# Patient Record
Sex: FEMALE | Race: OTHER | HISPANIC OR LATINO | URBAN - METROPOLITAN AREA
[De-identification: names, ages, dates, MRNs, and addresses within clinical notes are randomized per-mention and may not be internally consistent; named-entity substitution may affect disease eponyms.]

---

## 2022-07-03 ENCOUNTER — OUTPATIENT (OUTPATIENT)
Dept: OUTPATIENT SERVICES | Facility: HOSPITAL | Age: 22
LOS: 1 days | End: 2022-07-03
Payer: MEDICAID

## 2022-07-03 DIAGNOSIS — O26.899 OTHER SPECIFIED PREGNANCY RELATED CONDITIONS, UNSPECIFIED TRIMESTER: ICD-10-CM

## 2022-07-03 DIAGNOSIS — Z3A.00 WEEKS OF GESTATION OF PREGNANCY NOT SPECIFIED: ICD-10-CM

## 2022-07-03 LAB
ALBUMIN SERPL ELPH-MCNC: 2.8 G/DL — LOW (ref 3.5–5)
ALP SERPL-CCNC: 86 U/L — SIGNIFICANT CHANGE UP (ref 40–120)
ALT FLD-CCNC: 11 U/L DA — SIGNIFICANT CHANGE UP (ref 10–60)
AMYLASE P1 CFR SERPL: 71 U/L — SIGNIFICANT CHANGE UP (ref 25–115)
ANION GAP SERPL CALC-SCNC: 8 MMOL/L — SIGNIFICANT CHANGE UP (ref 5–17)
APPEARANCE UR: CLEAR — SIGNIFICANT CHANGE UP
AST SERPL-CCNC: 11 U/L — SIGNIFICANT CHANGE UP (ref 10–40)
BACTERIA # UR AUTO: ABNORMAL /HPF
BASOPHILS # BLD AUTO: 0.03 K/UL — SIGNIFICANT CHANGE UP (ref 0–0.2)
BASOPHILS NFR BLD AUTO: 0.3 % — SIGNIFICANT CHANGE UP (ref 0–2)
BILIRUB SERPL-MCNC: 0.7 MG/DL — SIGNIFICANT CHANGE UP (ref 0.2–1.2)
BILIRUB UR-MCNC: NEGATIVE — SIGNIFICANT CHANGE UP
BUN SERPL-MCNC: 3 MG/DL — LOW (ref 7–18)
CALCIUM SERPL-MCNC: 8.9 MG/DL — SIGNIFICANT CHANGE UP (ref 8.4–10.5)
CHLORIDE SERPL-SCNC: 107 MMOL/L — SIGNIFICANT CHANGE UP (ref 96–108)
CO2 SERPL-SCNC: 25 MMOL/L — SIGNIFICANT CHANGE UP (ref 22–31)
COLOR SPEC: YELLOW — SIGNIFICANT CHANGE UP
CREAT SERPL-MCNC: 0.45 MG/DL — LOW (ref 0.5–1.3)
DIFF PNL FLD: NEGATIVE — SIGNIFICANT CHANGE UP
EGFR: 140 ML/MIN/1.73M2 — SIGNIFICANT CHANGE UP
EOSINOPHIL # BLD AUTO: 0.02 K/UL — SIGNIFICANT CHANGE UP (ref 0–0.5)
EOSINOPHIL NFR BLD AUTO: 0.2 % — SIGNIFICANT CHANGE UP (ref 0–6)
GLUCOSE SERPL-MCNC: 82 MG/DL — SIGNIFICANT CHANGE UP (ref 70–99)
GLUCOSE UR QL: NEGATIVE — SIGNIFICANT CHANGE UP
HCT VFR BLD CALC: 34 % — LOW (ref 34.5–45)
HGB BLD-MCNC: 11.2 G/DL — LOW (ref 11.5–15.5)
IMM GRANULOCYTES NFR BLD AUTO: 0.8 % — SIGNIFICANT CHANGE UP (ref 0–1.5)
KETONES UR-MCNC: NEGATIVE — SIGNIFICANT CHANGE UP
LEUKOCYTE ESTERASE UR-ACNC: ABNORMAL
LIDOCAIN IGE QN: 105 U/L — SIGNIFICANT CHANGE UP (ref 73–393)
LYMPHOCYTES # BLD AUTO: 18.7 % — SIGNIFICANT CHANGE UP (ref 13–44)
LYMPHOCYTES # BLD AUTO: 2.2 K/UL — SIGNIFICANT CHANGE UP (ref 1–3.3)
MCHC RBC-ENTMCNC: 28.6 PG — SIGNIFICANT CHANGE UP (ref 27–34)
MCHC RBC-ENTMCNC: 32.9 GM/DL — SIGNIFICANT CHANGE UP (ref 32–36)
MCV RBC AUTO: 87 FL — SIGNIFICANT CHANGE UP (ref 80–100)
MONOCYTES # BLD AUTO: 0.98 K/UL — HIGH (ref 0–0.9)
MONOCYTES NFR BLD AUTO: 8.3 % — SIGNIFICANT CHANGE UP (ref 2–14)
NEUTROPHILS # BLD AUTO: 8.44 K/UL — HIGH (ref 1.8–7.4)
NEUTROPHILS NFR BLD AUTO: 71.7 % — SIGNIFICANT CHANGE UP (ref 43–77)
NITRITE UR-MCNC: NEGATIVE — SIGNIFICANT CHANGE UP
NRBC # BLD: 0 /100 WBCS — SIGNIFICANT CHANGE UP (ref 0–0)
PH UR: 7 — SIGNIFICANT CHANGE UP (ref 5–8)
PLATELET # BLD AUTO: 148 K/UL — LOW (ref 150–400)
POTASSIUM SERPL-MCNC: 3.5 MMOL/L — SIGNIFICANT CHANGE UP (ref 3.5–5.3)
POTASSIUM SERPL-SCNC: 3.5 MMOL/L — SIGNIFICANT CHANGE UP (ref 3.5–5.3)
PROT SERPL-MCNC: 7 G/DL — SIGNIFICANT CHANGE UP (ref 6–8.3)
PROT UR-MCNC: 30 MG/DL
RBC # BLD: 3.91 M/UL — SIGNIFICANT CHANGE UP (ref 3.8–5.2)
RBC # FLD: 13.4 % — SIGNIFICANT CHANGE UP (ref 10.3–14.5)
RBC CASTS # UR COMP ASSIST: SIGNIFICANT CHANGE UP /HPF (ref 0–2)
SODIUM SERPL-SCNC: 140 MMOL/L — SIGNIFICANT CHANGE UP (ref 135–145)
SP GR SPEC: 1 — LOW (ref 1.01–1.02)
UROBILINOGEN FLD QL: NEGATIVE — SIGNIFICANT CHANGE UP
WBC # BLD: 11.77 K/UL — HIGH (ref 3.8–10.5)
WBC # FLD AUTO: 11.77 K/UL — HIGH (ref 3.8–10.5)
WBC UR QL: SIGNIFICANT CHANGE UP /HPF (ref 0–5)

## 2022-07-03 PROCEDURE — G0463: CPT

## 2022-07-03 PROCEDURE — 99284 EMERGENCY DEPT VISIT MOD MDM: CPT

## 2022-07-03 PROCEDURE — 81001 URINALYSIS AUTO W/SCOPE: CPT

## 2022-07-03 PROCEDURE — 59025 FETAL NON-STRESS TEST: CPT

## 2022-07-03 PROCEDURE — 83690 ASSAY OF LIPASE: CPT

## 2022-07-03 PROCEDURE — 36415 COLL VENOUS BLD VENIPUNCTURE: CPT | Mod: NC,1L

## 2022-07-03 PROCEDURE — 59025 FETAL NON-STRESS TEST: CPT | Mod: 26,1L

## 2022-07-03 PROCEDURE — 36415 COLL VENOUS BLD VENIPUNCTURE: CPT

## 2022-07-03 PROCEDURE — 85025 COMPLETE CBC W/AUTO DIFF WBC: CPT

## 2022-07-03 PROCEDURE — 82150 ASSAY OF AMYLASE: CPT

## 2022-07-03 PROCEDURE — 80053 COMPREHEN METABOLIC PANEL: CPT

## 2022-07-03 RX ORDER — SODIUM CHLORIDE 9 MG/ML
1000 INJECTION, SOLUTION INTRAVENOUS ONCE
Refills: 0 | Status: COMPLETED | OUTPATIENT
Start: 2022-07-03 | End: 2022-07-03

## 2022-07-03 RX ORDER — ACETAMINOPHEN 500 MG
1000 TABLET ORAL ONCE
Refills: 0 | Status: COMPLETED | OUTPATIENT
Start: 2022-07-03 | End: 2022-07-03

## 2022-07-03 RX ORDER — ONDANSETRON 8 MG/1
4 TABLET, FILM COATED ORAL ONCE
Refills: 0 | Status: COMPLETED | OUTPATIENT
Start: 2022-07-03 | End: 2022-07-03

## 2022-07-03 RX ADMIN — Medication 400 MILLIGRAM(S): at 11:33

## 2022-07-03 RX ADMIN — ONDANSETRON 4 MILLIGRAM(S): 8 TABLET, FILM COATED ORAL at 11:33

## 2022-07-03 RX ADMIN — SODIUM CHLORIDE 1000 MILLILITER(S): 9 INJECTION, SOLUTION INTRAVENOUS at 11:01

## 2022-09-02 ENCOUNTER — OUTPATIENT (OUTPATIENT)
Dept: OUTPATIENT SERVICES | Facility: HOSPITAL | Age: 22
LOS: 1 days | End: 2022-09-02
Payer: MEDICAID

## 2022-09-02 DIAGNOSIS — O26.899 OTHER SPECIFIED PREGNANCY RELATED CONDITIONS, UNSPECIFIED TRIMESTER: ICD-10-CM

## 2022-09-02 DIAGNOSIS — Z3A.00 WEEKS OF GESTATION OF PREGNANCY NOT SPECIFIED: ICD-10-CM

## 2022-09-02 PROBLEM — Z78.9 OTHER SPECIFIED HEALTH STATUS: Chronic | Status: ACTIVE | Noted: 2022-07-03

## 2022-09-02 PROCEDURE — 59025 FETAL NON-STRESS TEST: CPT | Mod: 26

## 2022-09-02 PROCEDURE — 59025 FETAL NON-STRESS TEST: CPT

## 2022-09-02 PROCEDURE — G0463: CPT

## 2022-09-08 ENCOUNTER — INPATIENT (INPATIENT)
Facility: HOSPITAL | Age: 22
LOS: 3 days | Discharge: HOME | End: 2022-09-12
Attending: OBSTETRICS & GYNECOLOGY | Admitting: OBSTETRICS & GYNECOLOGY
Payer: MEDICAID

## 2022-09-08 VITALS — WEIGHT: 171.08 LBS | HEIGHT: 66 IN

## 2022-09-08 DIAGNOSIS — O26.899 OTHER SPECIFIED PREGNANCY RELATED CONDITIONS, UNSPECIFIED TRIMESTER: ICD-10-CM

## 2022-09-08 DIAGNOSIS — Z3A.00 WEEKS OF GESTATION OF PREGNANCY NOT SPECIFIED: ICD-10-CM

## 2022-09-08 LAB
ANISOCYTOSIS BLD QL: SLIGHT — SIGNIFICANT CHANGE UP
APTT BLD: 27 SEC — LOW (ref 27.5–35.5)
BASOPHILS # BLD AUTO: 0 K/UL — SIGNIFICANT CHANGE UP (ref 0–0.2)
BASOPHILS NFR BLD AUTO: 0 % — SIGNIFICANT CHANGE UP (ref 0–2)
BLD GP AB SCN SERPL QL: SIGNIFICANT CHANGE UP
DACRYOCYTES BLD QL SMEAR: SLIGHT — SIGNIFICANT CHANGE UP
EOSINOPHIL # BLD AUTO: 0 K/UL — SIGNIFICANT CHANGE UP (ref 0–0.5)
EOSINOPHIL NFR BLD AUTO: 0 % — SIGNIFICANT CHANGE UP (ref 0–6)
FIBRINOGEN PPP-MCNC: 675 MG/DL — HIGH (ref 340–550)
HCT VFR BLD CALC: 37.3 % — SIGNIFICANT CHANGE UP (ref 34.5–45)
HGB BLD-MCNC: 11.8 G/DL — SIGNIFICANT CHANGE UP (ref 11.5–15.5)
HYPOCHROMIA BLD QL: SLIGHT — SIGNIFICANT CHANGE UP
INR BLD: 0.96 RATIO — SIGNIFICANT CHANGE UP (ref 0.88–1.16)
LYMPHOCYTES # BLD AUTO: 19 % — SIGNIFICANT CHANGE UP (ref 13–44)
LYMPHOCYTES # BLD AUTO: 2.38 K/UL — SIGNIFICANT CHANGE UP (ref 1–3.3)
MANUAL SMEAR VERIFICATION: SIGNIFICANT CHANGE UP
MCHC RBC-ENTMCNC: 26.5 PG — LOW (ref 27–34)
MCHC RBC-ENTMCNC: 31.6 GM/DL — LOW (ref 32–36)
MCV RBC AUTO: 83.6 FL — SIGNIFICANT CHANGE UP (ref 80–100)
MONOCYTES # BLD AUTO: 0.88 K/UL — SIGNIFICANT CHANGE UP (ref 0–0.9)
MONOCYTES NFR BLD AUTO: 7 % — SIGNIFICANT CHANGE UP (ref 2–14)
NEUTROPHILS # BLD AUTO: 9.26 K/UL — HIGH (ref 1.8–7.4)
NEUTROPHILS NFR BLD AUTO: 74 % — SIGNIFICANT CHANGE UP (ref 43–77)
NRBC # BLD: 0 /100 — SIGNIFICANT CHANGE UP (ref 0–0)
OVALOCYTES BLD QL SMEAR: SLIGHT — SIGNIFICANT CHANGE UP
PLAT MORPH BLD: NORMAL — SIGNIFICANT CHANGE UP
PLATELET # BLD AUTO: 109 K/UL — LOW (ref 150–400)
POIKILOCYTOSIS BLD QL AUTO: SLIGHT — SIGNIFICANT CHANGE UP
POLYCHROMASIA BLD QL SMEAR: SLIGHT — SIGNIFICANT CHANGE UP
PROTHROM AB SERPL-ACNC: 11.4 SEC — SIGNIFICANT CHANGE UP (ref 10.5–13.4)
RBC # BLD: 4.46 M/UL — SIGNIFICANT CHANGE UP (ref 3.8–5.2)
RBC # FLD: 16 % — HIGH (ref 10.3–14.5)
RBC BLD AUTO: ABNORMAL
SARS-COV-2 RNA SPEC QL NAA+PROBE: SIGNIFICANT CHANGE UP
T PALLIDUM AB TITR SER: NEGATIVE — SIGNIFICANT CHANGE UP
WBC # BLD: 12.51 K/UL — HIGH (ref 3.8–10.5)
WBC # FLD AUTO: 12.51 K/UL — HIGH (ref 3.8–10.5)

## 2022-09-08 RX ORDER — DIBUCAINE 1 %
1 OINTMENT (GRAM) RECTAL EVERY 6 HOURS
Refills: 0 | Status: DISCONTINUED | OUTPATIENT
Start: 2022-09-08 | End: 2022-09-12

## 2022-09-08 RX ORDER — AER TRAVELER 0.5 G/1
1 SOLUTION RECTAL; TOPICAL EVERY 4 HOURS
Refills: 0 | Status: DISCONTINUED | OUTPATIENT
Start: 2022-09-08 | End: 2022-09-12

## 2022-09-08 RX ORDER — HYDROCORTISONE 1 %
1 OINTMENT (GRAM) TOPICAL EVERY 6 HOURS
Refills: 0 | Status: DISCONTINUED | OUTPATIENT
Start: 2022-09-08 | End: 2022-09-12

## 2022-09-08 RX ORDER — DIPHENHYDRAMINE HCL 50 MG
25 CAPSULE ORAL EVERY 6 HOURS
Refills: 0 | Status: DISCONTINUED | OUTPATIENT
Start: 2022-09-08 | End: 2022-09-12

## 2022-09-08 RX ORDER — IBUPROFEN 200 MG
600 TABLET ORAL EVERY 6 HOURS
Refills: 0 | Status: DISCONTINUED | OUTPATIENT
Start: 2022-09-08 | End: 2022-09-12

## 2022-09-08 RX ORDER — SIMETHICONE 80 MG/1
80 TABLET, CHEWABLE ORAL EVERY 4 HOURS
Refills: 0 | Status: DISCONTINUED | OUTPATIENT
Start: 2022-09-08 | End: 2022-09-12

## 2022-09-08 RX ORDER — CHLORHEXIDINE GLUCONATE 213 G/1000ML
1 SOLUTION TOPICAL ONCE
Refills: 0 | Status: DISCONTINUED | OUTPATIENT
Start: 2022-09-08 | End: 2022-09-09

## 2022-09-08 RX ORDER — FERROUS SULFATE 325(65) MG
325 TABLET ORAL DAILY
Refills: 0 | Status: DISCONTINUED | OUTPATIENT
Start: 2022-09-09 | End: 2022-09-12

## 2022-09-08 RX ORDER — OXYCODONE HYDROCHLORIDE 5 MG/1
5 TABLET ORAL
Refills: 0 | Status: DISCONTINUED | OUTPATIENT
Start: 2022-09-08 | End: 2022-09-12

## 2022-09-08 RX ORDER — CITRIC ACID/SODIUM CITRATE 300-500 MG
15 SOLUTION, ORAL ORAL EVERY 6 HOURS
Refills: 0 | Status: DISCONTINUED | OUTPATIENT
Start: 2022-09-08 | End: 2022-09-09

## 2022-09-08 RX ORDER — TETANUS TOXOID, REDUCED DIPHTHERIA TOXOID AND ACELLULAR PERTUSSIS VACCINE, ADSORBED 5; 2.5; 8; 8; 2.5 [IU]/.5ML; [IU]/.5ML; UG/.5ML; UG/.5ML; UG/.5ML
0.5 SUSPENSION INTRAMUSCULAR ONCE
Refills: 0 | Status: DISCONTINUED | OUTPATIENT
Start: 2022-09-08 | End: 2022-09-12

## 2022-09-08 RX ORDER — SODIUM CHLORIDE 9 MG/ML
3 INJECTION INTRAMUSCULAR; INTRAVENOUS; SUBCUTANEOUS EVERY 8 HOURS
Refills: 0 | Status: DISCONTINUED | OUTPATIENT
Start: 2022-09-08 | End: 2022-09-12

## 2022-09-08 RX ORDER — BENZOCAINE 10 %
1 GEL (GRAM) MUCOUS MEMBRANE EVERY 6 HOURS
Refills: 0 | Status: DISCONTINUED | OUTPATIENT
Start: 2022-09-08 | End: 2022-09-12

## 2022-09-08 RX ORDER — MAGNESIUM HYDROXIDE 400 MG/1
30 TABLET, CHEWABLE ORAL
Refills: 0 | Status: DISCONTINUED | OUTPATIENT
Start: 2022-09-08 | End: 2022-09-12

## 2022-09-08 RX ORDER — SODIUM CHLORIDE 9 MG/ML
1000 INJECTION, SOLUTION INTRAVENOUS
Refills: 0 | Status: DISCONTINUED | OUTPATIENT
Start: 2022-09-08 | End: 2022-09-09

## 2022-09-08 RX ORDER — ACETAMINOPHEN 500 MG
975 TABLET ORAL EVERY 6 HOURS
Refills: 0 | Status: DISCONTINUED | OUTPATIENT
Start: 2022-09-08 | End: 2022-09-12

## 2022-09-08 RX ORDER — OXYTOCIN 10 UNIT/ML
4 VIAL (ML) INJECTION
Qty: 30 | Refills: 0 | Status: DISCONTINUED | OUTPATIENT
Start: 2022-09-08 | End: 2022-09-09

## 2022-09-08 RX ORDER — LANOLIN
1 OINTMENT (GRAM) TOPICAL EVERY 6 HOURS
Refills: 0 | Status: DISCONTINUED | OUTPATIENT
Start: 2022-09-08 | End: 2022-09-12

## 2022-09-08 RX ORDER — PRAMOXINE HYDROCHLORIDE 150 MG/15G
1 AEROSOL, FOAM RECTAL EVERY 4 HOURS
Refills: 0 | Status: DISCONTINUED | OUTPATIENT
Start: 2022-09-08 | End: 2022-09-12

## 2022-09-08 RX ORDER — OXYTOCIN 10 UNIT/ML
333.33 VIAL (ML) INJECTION
Qty: 20 | Refills: 0 | Status: DISCONTINUED | OUTPATIENT
Start: 2022-09-08 | End: 2022-09-12

## 2022-09-08 NOTE — PATIENT PROFILE OB - FALL HARM RISK - UNIVERSAL INTERVENTIONS
Bed in lowest position, wheels locked, appropriate side rails in place/Call bell, personal items and telephone in reach/Instruct patient to call for assistance before getting out of bed or chair/Non-slip footwear when patient is out of bed/[place] to call system/Physically safe environment - no spills, clutter or unnecessary equipment/Purposeful Proactive Rounding/Room/bathroom lighting operational, light cord in reach

## 2022-09-09 RX ORDER — FERROUS SULFATE 325(65) MG
1 TABLET ORAL
Qty: 60 | Refills: 0
Start: 2022-09-09 | End: 2022-10-08

## 2022-09-09 RX ORDER — DOCUSATE SODIUM 100 MG
1 CAPSULE ORAL
Qty: 60 | Refills: 0
Start: 2022-09-09 | End: 2022-10-08

## 2022-09-09 RX ORDER — ASCORBIC ACID 60 MG
1 TABLET,CHEWABLE ORAL
Qty: 30 | Refills: 0
Start: 2022-09-09 | End: 2022-10-08

## 2022-09-09 RX ORDER — IBUPROFEN 200 MG
1 TABLET ORAL
Qty: 120 | Refills: 0
Start: 2022-09-09 | End: 2022-10-08

## 2022-09-09 RX ADMIN — Medication 1 SPRAY(S): at 13:42

## 2022-09-09 RX ADMIN — Medication 1 TABLET(S): at 13:42

## 2022-09-09 RX ADMIN — Medication 1000 MILLIUNIT(S)/MIN: at 01:09

## 2022-09-09 RX ADMIN — Medication 600 MILLIGRAM(S): at 03:09

## 2022-09-09 RX ADMIN — Medication 975 MILLIGRAM(S): at 01:54

## 2022-09-09 RX ADMIN — Medication 975 MILLIGRAM(S): at 18:33

## 2022-09-09 RX ADMIN — AER TRAVELER 1 APPLICATION(S): 0.5 SOLUTION RECTAL; TOPICAL at 09:07

## 2022-09-09 RX ADMIN — SODIUM CHLORIDE 3 MILLILITER(S): 9 INJECTION INTRAMUSCULAR; INTRAVENOUS; SUBCUTANEOUS at 22:00

## 2022-09-09 RX ADMIN — Medication 325 MILLIGRAM(S): at 13:42

## 2022-09-09 RX ADMIN — Medication 975 MILLIGRAM(S): at 17:30

## 2022-09-09 RX ADMIN — SODIUM CHLORIDE 3 MILLILITER(S): 9 INJECTION INTRAMUSCULAR; INTRAVENOUS; SUBCUTANEOUS at 14:58

## 2022-09-09 RX ADMIN — SODIUM CHLORIDE 3 MILLILITER(S): 9 INJECTION INTRAMUSCULAR; INTRAVENOUS; SUBCUTANEOUS at 06:28

## 2022-09-09 RX ADMIN — Medication 600 MILLIGRAM(S): at 22:47

## 2022-09-09 RX ADMIN — Medication 975 MILLIGRAM(S): at 02:30

## 2022-09-09 RX ADMIN — Medication 600 MILLIGRAM(S): at 03:40

## 2022-09-09 NOTE — DISCHARGE NOTE OB - MEDICATION SUMMARY - MEDICATIONS TO TAKE

## 2022-09-09 NOTE — DISCHARGE NOTE OB - PLAN OF CARE
No sex, no pushing, no heavy lifting, no strenuous activity, Nothing per vagina x  6 weeks - no sex, tampons, douching, tub baths, etc. Continue breastfeeding.  Motrin as needed for pain. Follow up in office in 5-6 weeks for post partum check up. Please call for appt. take iron, folic acid, vitamin C, and prenatal vitamins. eat iron fortified food. Please take iron tablets three times daily. Return if any dizziness, shortness of breath, palpitations, chest pain or any other acute symptoms. Please have caution when holding baby to prevent any falls or dizziness with baby in arms.

## 2022-09-09 NOTE — DISCHARGE NOTE OB - CARE PROVIDER_API CALL
Saman Molina)  Obstetrics and Gynecology  [address], [address]  [place] [address]  Phone: [phone]  Fax: [phone]  Follow Up Time:

## 2022-09-09 NOTE — DISCHARGE NOTE OB - HOSPITAL COURSE
Pt admitted in early labor at 40 weeks s/p . post partum care and breastfeeding instructions provided. normal couse of labor and delivery

## 2022-09-09 NOTE — DISCHARGE NOTE OB - MATERIALS PROVIDED
[facility] [place] Screening Program/[place]  Immunization Record/Breastfeeding Mother’s Support Group Information/Guide to Postpartum Care/Back To Sleep Handout/Shaken Baby Prevention Handout/Breastfeeding Guide and Packet/Birth Certificate Instructions/Discharge Medication Information for Patients and Families Pocket Guide/Letter of Medical Neccessity

## 2022-09-09 NOTE — DISCHARGE NOTE OB - PATIENT PORTAL LINK FT
You can access the FollowMyHealth Patient Portal offered by [facility] by registering at the following website: [email or web address]. By joining [facility]’s FollowMyHealth portal, you will also be able to view your health information using other applications (apps) compatible with our system.

## 2022-09-09 NOTE — DISCHARGE NOTE OB - CARE PLAN
Principal Discharge DX:	 (normal spontaneous vaginal delivery)  Assessment and plan of treatment:	No sex, no pushing, no heavy lifting, no strenuous activity, Nothing per vagina x  6 weeks - no sex, tampons, douching, tub baths, etc. Continue breastfeeding.  Motrin as needed for pain. Follow up in office in 5-6 weeks for post partum check up. Please call for appt.   1 Principal Discharge DX:	 (normal spontaneous vaginal delivery)  Assessment and plan of treatment:	No sex, no pushing, no heavy lifting, no strenuous activity, Nothing per vagina x  6 weeks - no sex, tampons, douching, tub baths, etc. Continue breastfeeding.  Motrin as needed for pain. Follow up in office in 5-6 weeks for post partum check up. Please call for appt.  Secondary Diagnosis:	Anemia due to acute blood loss  Assessment and plan of treatment:	take iron, folic acid, vitamin C, and prenatal vitamins. eat iron fortified food. Please take iron tablets three times daily. Return if any dizziness, shortness of breath, palpitations, chest pain or any other acute symptoms. Please have caution when holding baby to prevent any falls or dizziness with baby in arms.

## 2022-09-09 NOTE — PROGRESS NOTE ADULT - SUBJECTIVE AND OBJECTIVE BOX
OBGYN PA NOTE PPD1  Patient seen and evaluated at bedside,  resting comfortably w/o any acute  complaints.  Denies fever, HA, CP, SOB, N/V/D, dizziness, palpitations, worsening abdominal pain, worsening vaginal bleeding, or any other concerns.  Ambulating and voiding without difficulty.  Passing flatus and tolerating regular diet.  Attempting to breastfeed.     Vital Signs Last 24 Hrs  T(C): 36.8 (09 Sep 2022 05:55), Max: 37.7 (09 Sep 2022 01:45)  T(F): 98.3 (09 Sep 2022 05:55), Max: 99.9 (09 Sep 2022 01:45)  HR: 100 (09 Sep 2022 05:55) (89 - 113)  BP: 101/60 (09 Sep 2022 05:55) (101/60 - 131/74)  BP(mean): 93 (09 Sep 2022 01:08) (87 - 96)  RR: 17 (09 Sep 2022 05:55) (16 - 20)  SpO2: 97% (09 Sep 2022 05:55) (96% - 99%)    Parameters below as of 09 Sep 2022 05:55  Patient On (Oxygen Delivery Method): room air        Physical Exam:     Gen: A&Ox 3, NAD  Chest: CTAB/L  Cardiac: S1+S2+ RRR  Breast: Soft, nontender, nonengorged  Abdomen: Soft, nontender, Fundus firm below umbilicus, +BS  Gyn: Mild lochia, intact/repaired  Extremities: Nontender, DTRS 2+, no worsening edema

## 2022-09-10 DIAGNOSIS — D62 ACUTE POSTHEMORRHAGIC ANEMIA: ICD-10-CM

## 2022-09-10 LAB
APPEARANCE UR: CLEAR — SIGNIFICANT CHANGE UP
BACTERIA # UR AUTO: ABNORMAL /HPF
BASOPHILS # BLD AUTO: 0.02 K/UL — SIGNIFICANT CHANGE UP (ref 0–0.2)
BASOPHILS # BLD AUTO: 0.03 K/UL — SIGNIFICANT CHANGE UP (ref 0–0.2)
BASOPHILS # BLD AUTO: 0.04 K/UL — SIGNIFICANT CHANGE UP (ref 0–0.2)
BASOPHILS NFR BLD AUTO: 0.2 % — SIGNIFICANT CHANGE UP (ref 0–2)
BASOPHILS NFR BLD AUTO: 0.2 % — SIGNIFICANT CHANGE UP (ref 0–2)
BASOPHILS NFR BLD AUTO: 0.3 % — SIGNIFICANT CHANGE UP (ref 0–2)
BILIRUB UR-MCNC: NEGATIVE — SIGNIFICANT CHANGE UP
COLOR SPEC: YELLOW — SIGNIFICANT CHANGE UP
DIFF PNL FLD: ABNORMAL
EOSINOPHIL # BLD AUTO: 0.09 K/UL — SIGNIFICANT CHANGE UP (ref 0–0.5)
EOSINOPHIL # BLD AUTO: 0.12 K/UL — SIGNIFICANT CHANGE UP (ref 0–0.5)
EOSINOPHIL # BLD AUTO: 0.7 K/UL — HIGH (ref 0–0.5)
EOSINOPHIL NFR BLD AUTO: 0.7 % — SIGNIFICANT CHANGE UP (ref 0–6)
EOSINOPHIL NFR BLD AUTO: 0.9 % — SIGNIFICANT CHANGE UP (ref 0–6)
EOSINOPHIL NFR BLD AUTO: 6.7 % — HIGH (ref 0–6)
EPI CELLS # UR: ABNORMAL /HPF
GLUCOSE UR QL: NEGATIVE — SIGNIFICANT CHANGE UP
HCT VFR BLD CALC: 24.8 % — LOW (ref 34.5–45)
HCT VFR BLD CALC: 27.2 % — LOW (ref 34.5–45)
HCT VFR BLD CALC: 29.1 % — LOW (ref 34.5–45)
HGB BLD-MCNC: 7.9 G/DL — LOW (ref 11.5–15.5)
HGB BLD-MCNC: 8.5 G/DL — LOW (ref 11.5–15.5)
HGB BLD-MCNC: 9.1 G/DL — LOW (ref 11.5–15.5)
IMM GRANULOCYTES NFR BLD AUTO: 0.7 % — SIGNIFICANT CHANGE UP (ref 0–1.5)
IMM GRANULOCYTES NFR BLD AUTO: 0.7 % — SIGNIFICANT CHANGE UP (ref 0–1.5)
IMM GRANULOCYTES NFR BLD AUTO: 0.9 % — SIGNIFICANT CHANGE UP (ref 0–1.5)
KETONES UR-MCNC: NEGATIVE — SIGNIFICANT CHANGE UP
LEUKOCYTE ESTERASE UR-ACNC: ABNORMAL
LYMPHOCYTES # BLD AUTO: 1.49 K/UL — SIGNIFICANT CHANGE UP (ref 1–3.3)
LYMPHOCYTES # BLD AUTO: 1.91 K/UL — SIGNIFICANT CHANGE UP (ref 1–3.3)
LYMPHOCYTES # BLD AUTO: 14.3 % — SIGNIFICANT CHANGE UP (ref 13–44)
LYMPHOCYTES # BLD AUTO: 14.4 % — SIGNIFICANT CHANGE UP (ref 13–44)
LYMPHOCYTES # BLD AUTO: 18.2 % — SIGNIFICANT CHANGE UP (ref 13–44)
LYMPHOCYTES # BLD AUTO: 2.34 K/UL — SIGNIFICANT CHANGE UP (ref 1–3.3)
MCHC RBC-ENTMCNC: 25.8 PG — LOW (ref 27–34)
MCHC RBC-ENTMCNC: 26.2 PG — LOW (ref 27–34)
MCHC RBC-ENTMCNC: 26.5 PG — LOW (ref 27–34)
MCHC RBC-ENTMCNC: 31.3 GM/DL — LOW (ref 32–36)
MCHC RBC-ENTMCNC: 31.3 GM/DL — LOW (ref 32–36)
MCHC RBC-ENTMCNC: 31.9 GM/DL — LOW (ref 32–36)
MCV RBC AUTO: 81 FL — SIGNIFICANT CHANGE UP (ref 80–100)
MCV RBC AUTO: 83.9 FL — SIGNIFICANT CHANGE UP (ref 80–100)
MCV RBC AUTO: 84.7 FL — SIGNIFICANT CHANGE UP (ref 80–100)
MONOCYTES # BLD AUTO: 0.61 K/UL — SIGNIFICANT CHANGE UP (ref 0–0.9)
MONOCYTES # BLD AUTO: 1.03 K/UL — HIGH (ref 0–0.9)
MONOCYTES # BLD AUTO: 1.07 K/UL — HIGH (ref 0–0.9)
MONOCYTES NFR BLD AUTO: 5.9 % — SIGNIFICANT CHANGE UP (ref 2–14)
MONOCYTES NFR BLD AUTO: 7.7 % — SIGNIFICANT CHANGE UP (ref 2–14)
MONOCYTES NFR BLD AUTO: 8.3 % — SIGNIFICANT CHANGE UP (ref 2–14)
NEUTROPHILS # BLD AUTO: 10.14 K/UL — HIGH (ref 1.8–7.4)
NEUTROPHILS # BLD AUTO: 7.47 K/UL — HIGH (ref 1.8–7.4)
NEUTROPHILS # BLD AUTO: 9.23 K/UL — HIGH (ref 1.8–7.4)
NEUTROPHILS NFR BLD AUTO: 71.9 % — SIGNIFICANT CHANGE UP (ref 43–77)
NEUTROPHILS NFR BLD AUTO: 71.9 % — SIGNIFICANT CHANGE UP (ref 43–77)
NEUTROPHILS NFR BLD AUTO: 76.1 % — SIGNIFICANT CHANGE UP (ref 43–77)
NITRITE UR-MCNC: NEGATIVE — SIGNIFICANT CHANGE UP
NRBC # BLD: 0 /100 WBCS — SIGNIFICANT CHANGE UP (ref 0–0)
PH UR: 8 — SIGNIFICANT CHANGE UP (ref 5–8)
PLATELET # BLD AUTO: 107 K/UL — LOW (ref 150–400)
PLATELET # BLD AUTO: 120 K/UL — LOW (ref 150–400)
PLATELET # BLD AUTO: 207 K/UL — SIGNIFICANT CHANGE UP (ref 150–400)
PROT UR-MCNC: NEGATIVE — SIGNIFICANT CHANGE UP
RBC # BLD: 3.06 M/UL — LOW (ref 3.8–5.2)
RBC # BLD: 3.21 M/UL — LOW (ref 3.8–5.2)
RBC # BLD: 3.47 M/UL — LOW (ref 3.8–5.2)
RBC # FLD: 14.8 % — HIGH (ref 10.3–14.5)
RBC # FLD: 16.2 % — HIGH (ref 10.3–14.5)
RBC # FLD: 16.2 % — HIGH (ref 10.3–14.5)
RBC CASTS # UR COMP ASSIST: >50 /HPF (ref 0–2)
SP GR SPEC: 1.01 — SIGNIFICANT CHANGE UP (ref 1.01–1.02)
UROBILINOGEN FLD QL: NEGATIVE — SIGNIFICANT CHANGE UP
WBC # BLD: 10.38 K/UL — SIGNIFICANT CHANGE UP (ref 3.8–10.5)
WBC # BLD: 12.85 K/UL — HIGH (ref 3.8–10.5)
WBC # BLD: 13.33 K/UL — HIGH (ref 3.8–10.5)
WBC # FLD AUTO: 10.38 K/UL — SIGNIFICANT CHANGE UP (ref 3.8–10.5)
WBC # FLD AUTO: 12.85 K/UL — HIGH (ref 3.8–10.5)
WBC # FLD AUTO: 13.33 K/UL — HIGH (ref 3.8–10.5)
WBC UR QL: SIGNIFICANT CHANGE UP /HPF (ref 0–5)

## 2022-09-10 PROCEDURE — 71045 X-RAY EXAM CHEST 1 VIEW: CPT | Mod: 26

## 2022-09-10 RX ORDER — GENTAMICIN SULFATE 40 MG/ML
80 VIAL (ML) INJECTION EVERY 8 HOURS
Refills: 0 | Status: DISCONTINUED | OUTPATIENT
Start: 2022-09-10 | End: 2022-09-11

## 2022-09-10 RX ORDER — GENTAMICIN SULFATE 40 MG/ML
VIAL (ML) INJECTION
Refills: 0 | Status: DISCONTINUED | OUTPATIENT
Start: 2022-09-10 | End: 2022-09-11

## 2022-09-10 RX ORDER — GENTAMICIN SULFATE 40 MG/ML
120 VIAL (ML) INJECTION ONCE
Refills: 0 | Status: COMPLETED | OUTPATIENT
Start: 2022-09-10 | End: 2022-09-10

## 2022-09-10 RX ADMIN — Medication 100 MILLIGRAM(S): at 12:26

## 2022-09-10 RX ADMIN — Medication 200 MILLIGRAM(S): at 12:28

## 2022-09-10 RX ADMIN — Medication 600 MILLIGRAM(S): at 09:22

## 2022-09-10 RX ADMIN — Medication 975 MILLIGRAM(S): at 19:56

## 2022-09-10 RX ADMIN — SODIUM CHLORIDE 3 MILLILITER(S): 9 INJECTION INTRAMUSCULAR; INTRAVENOUS; SUBCUTANEOUS at 14:00

## 2022-09-10 RX ADMIN — Medication 1 TABLET(S): at 12:28

## 2022-09-10 RX ADMIN — Medication 325 MILLIGRAM(S): at 12:29

## 2022-09-10 RX ADMIN — Medication 975 MILLIGRAM(S): at 11:05

## 2022-09-10 RX ADMIN — Medication 975 MILLIGRAM(S): at 10:38

## 2022-09-10 RX ADMIN — SODIUM CHLORIDE 3 MILLILITER(S): 9 INJECTION INTRAMUSCULAR; INTRAVENOUS; SUBCUTANEOUS at 22:55

## 2022-09-10 RX ADMIN — Medication 100 MILLIGRAM(S): at 21:09

## 2022-09-10 RX ADMIN — Medication 600 MILLIGRAM(S): at 09:50

## 2022-09-10 RX ADMIN — Medication 975 MILLIGRAM(S): at 20:30

## 2022-09-10 RX ADMIN — Medication 204 MILLIGRAM(S): at 21:08

## 2022-09-10 NOTE — PROGRESS NOTE ADULT - SUBJECTIVE AND OBJECTIVE BOX
RN notified PA of postpartum temp 101  Patient seen at bedside resting comfortably c/o shivering and fever.  Ambulating and voiding without difficulty.  Passing flatus and tolerating regular diet. Pt both breast/bottle feeding.  Pt denies weakness, headache, chest pain, shortness of breath, N/V/D, dizziness, palpitations, worsening abdominal pain, worsening vaginal bleeding, or any other concerns.      Vital Signs Last 24 Hrs  T(C): 37.8 (10 Sep 2022 11:42), Max: 38.3 (10 Sep 2022 10:25)  T(F): 100 (10 Sep 2022 11:42), Max: 101 (10 Sep 2022 10:25)  HR: 107 (10 Sep 2022 11:42) (85 - 107)  BP: 115/78 (10 Sep 2022 11:42) (110/71 - 121/80)  BP(mean): --  RR: 18 (10 Sep 2022 11:42) (18 - 18)  SpO2: 97% (10 Sep 2022 11:42) (97% - 98%)    Parameters below as of 10 Sep 2022 11:42  Patient On (Oxygen Delivery Method): room air        Physical Exam:     Gen: A&Ox 3, NAD  Chest: CTA B/L  Cardiac: S1,S2; RRR  Breast: Soft, nontender, nonengorged  Abdomen: +BS; Soft, nontender, ND; Fundus firm below umbilicus  Gyn: Min lochia  Ext: Nontender, DTRS 2+, no worsening edema                          9.1    13.33 )-----------( 107      ( 10 Sep 2022 11:30 )             29.1        A/P:  22yo ppd#2 s/p  @ 40.1wks, acute blood loss anemia, noted to have postpartum fever tmax 101, suspected endometritis, pt stable  - gentamycin/clindamycin   - ua/uc  - bc  - cbc  - cxr  - cont close monitoring  - pain management prn  - dvt ppx: encourage ambulation  - d/w dr. patel

## 2022-09-10 NOTE — PROGRESS NOTE ADULT - SUBJECTIVE AND OBJECTIVE BOX
late entry note   Patient seen at bedside resting comfortably offers no current complaints.  Ambulating and voiding without difficulty.  Passing flatus and tolerating regular diet. Pt attempting to breast feed, primarily bottle feeding,  Pt denies weakness, headache, chest pain, shortness of breath, N/V/D, dizziness, palpitations, worsening abdominal pain, worsening vaginal bleeding, or any other concerns.      Vital Signs Last 24 Hrs  T(C): 37.8 (10 Sep 2022 11:42), Max: 38.3 (10 Sep 2022 10:25)  T(F): 100 (10 Sep 2022 11:42), Max: 101 (10 Sep 2022 10:25)  HR: 107 (10 Sep 2022 11:42) (85 - 107)  BP: 115/78 (10 Sep 2022 11:42) (110/71 - 121/80)  BP(mean): --  RR: 18 (10 Sep 2022 11:42) (18 - 18)  SpO2: 97% (10 Sep 2022 11:42) (97% - 98%)    Parameters below as of 10 Sep 2022 11:42  Patient On (Oxygen Delivery Method): room air        Physical Exam:     Gen: A&Ox 3, NAD  Chest: CTA B/L  Cardiac: S1,S2; RRR  Breast: Soft, nontender, nonengorged  Abdomen: +BS; Soft, nontender, ND; Fundus firm below umbilicus  Gyn: Min lochia  Ext: Nontender, DTRS 2+, no worsening edema                          9.1    13.33 )-----------( 107      ( 10 Sep 2022 11:30 )             29.1        A/P:  PPD#2 s/p  @ 40.1wks, acute blood loss anemia, pt stable  - iron, vitC   - Discharge home with instructions  -Follow up in office in 5-6 weeks for postpartum visit  -Breastfeeding encouraged   -d/w Dr. Molina

## 2022-09-11 LAB
ANISOCYTOSIS BLD QL: SLIGHT — SIGNIFICANT CHANGE UP
BASOPHILS # BLD AUTO: 0 K/UL — SIGNIFICANT CHANGE UP (ref 0–0.2)
BASOPHILS NFR BLD AUTO: 0 % — SIGNIFICANT CHANGE UP (ref 0–2)
COVID-19 SPIKE DOMAIN AB INTERP: POSITIVE
COVID-19 SPIKE DOMAIN ANTIBODY RESULT: >250 U/ML — HIGH
EOSINOPHIL # BLD AUTO: 0.33 K/UL — SIGNIFICANT CHANGE UP (ref 0–0.5)
EOSINOPHIL NFR BLD AUTO: 3 % — SIGNIFICANT CHANGE UP (ref 0–6)
HCT VFR BLD CALC: 32.4 % — LOW (ref 34.5–45)
HGB BLD-MCNC: 10.1 G/DL — LOW (ref 11.5–15.5)
HYPOCHROMIA BLD QL: SLIGHT — SIGNIFICANT CHANGE UP
LG PLATELETS BLD QL AUTO: SLIGHT — SIGNIFICANT CHANGE UP
LYMPHOCYTES # BLD AUTO: 2.83 K/UL — SIGNIFICANT CHANGE UP (ref 1–3.3)
LYMPHOCYTES # BLD AUTO: 26 % — SIGNIFICANT CHANGE UP (ref 13–44)
MANUAL SMEAR VERIFICATION: SIGNIFICANT CHANGE UP
MCHC RBC-ENTMCNC: 26.5 PG — LOW (ref 27–34)
MCHC RBC-ENTMCNC: 31.2 GM/DL — LOW (ref 32–36)
MCV RBC AUTO: 85 FL — SIGNIFICANT CHANGE UP (ref 80–100)
MICROCYTES BLD QL: SLIGHT — SIGNIFICANT CHANGE UP
MONOCYTES # BLD AUTO: 0.76 K/UL — SIGNIFICANT CHANGE UP (ref 0–0.9)
MONOCYTES NFR BLD AUTO: 7 % — SIGNIFICANT CHANGE UP (ref 2–14)
NEUTROPHILS # BLD AUTO: 6.85 K/UL — SIGNIFICANT CHANGE UP (ref 1.8–7.4)
NEUTROPHILS NFR BLD AUTO: 63 % — SIGNIFICANT CHANGE UP (ref 43–77)
NRBC # BLD: 0 /100 — SIGNIFICANT CHANGE UP (ref 0–0)
PLAT MORPH BLD: NORMAL — SIGNIFICANT CHANGE UP
PLATELET # BLD AUTO: 147 K/UL — LOW (ref 150–400)
POIKILOCYTOSIS BLD QL AUTO: SLIGHT — SIGNIFICANT CHANGE UP
POLYCHROMASIA BLD QL SMEAR: SLIGHT — SIGNIFICANT CHANGE UP
RBC # BLD: 3.81 M/UL — SIGNIFICANT CHANGE UP (ref 3.8–5.2)
RBC # FLD: 16.6 % — HIGH (ref 10.3–14.5)
RBC BLD AUTO: ABNORMAL
SARS-COV-2 IGG+IGM SERPL QL IA: >250 U/ML — HIGH
SARS-COV-2 IGG+IGM SERPL QL IA: POSITIVE
SPHEROCYTES BLD QL SMEAR: SLIGHT — SIGNIFICANT CHANGE UP
VARIANT LYMPHS # BLD: 1 % — SIGNIFICANT CHANGE UP (ref 0–6)
WBC # BLD: 10.87 K/UL — HIGH (ref 3.8–10.5)
WBC # FLD AUTO: 10.87 K/UL — HIGH (ref 3.8–10.5)

## 2022-09-11 RX ADMIN — SODIUM CHLORIDE 3 MILLILITER(S): 9 INJECTION INTRAMUSCULAR; INTRAVENOUS; SUBCUTANEOUS at 14:03

## 2022-09-11 RX ADMIN — Medication 325 MILLIGRAM(S): at 11:28

## 2022-09-11 RX ADMIN — Medication 600 MILLIGRAM(S): at 05:10

## 2022-09-11 RX ADMIN — Medication 1 TABLET(S): at 11:27

## 2022-09-11 RX ADMIN — SODIUM CHLORIDE 3 MILLILITER(S): 9 INJECTION INTRAMUSCULAR; INTRAVENOUS; SUBCUTANEOUS at 21:44

## 2022-09-11 RX ADMIN — Medication 600 MILLIGRAM(S): at 04:37

## 2022-09-11 RX ADMIN — Medication 100 MILLIGRAM(S): at 13:30

## 2022-09-11 RX ADMIN — Medication 600 MILLIGRAM(S): at 23:18

## 2022-09-11 RX ADMIN — Medication 204 MILLIGRAM(S): at 05:16

## 2022-09-11 RX ADMIN — Medication 100 MILLIGRAM(S): at 05:16

## 2022-09-11 RX ADMIN — Medication 600 MILLIGRAM(S): at 23:55

## 2022-09-11 RX ADMIN — Medication 204 MILLIGRAM(S): at 14:17

## 2022-09-11 RX ADMIN — SODIUM CHLORIDE 3 MILLILITER(S): 9 INJECTION INTRAMUSCULAR; INTRAVENOUS; SUBCUTANEOUS at 05:07

## 2022-09-11 NOTE — PROGRESS NOTE ADULT - ASSESSMENT
A/P:  21y F Postpartum day #1 s/p  @40w1d, in stable condition  -Continue pain management  -Encourage OOB and ambulation  -f/u CBC 6pm  -Continue post partum care  -Plan for discharge tomorrow.     Dr Molina aware 
A/P:  20yo ppd#2 s/p  @ 40.1wks, acute blood loss anemia, noted to have postpartum fever tmax 101, suspected endometritis, pt stable  - gentamycin/clindamycin   - ua/uc  - bc  - cbc  - cxr  - cont close monitoring  - pain management prn  - dvt ppx: encourage ambulation  - d/w dr. patel 
A/P:  20yo ppd#3 s/p  @ 40.1wks, acute blood loss anemia, noted to have postpartum fever tmax 101 on 9/10/22, suspected endometritis, pt stable  - gentamycin/clindamycin   -  follow up urine/blood cultures  - cxr performed, radiology called to follow up official read  - pain management prn  - postpartum care  - encourage ambulation   -d/w Dr. Molina 
A/P:  PPD#2 s/p  @ 40.1wks, acute blood loss anemia, pt stable  - iron, vitC   - Discharge home with instructions  -Follow up in office in 5-6 weeks for postpartum visit  -Breastfeeding encouraged   -d/w Dr. Molina

## 2022-09-11 NOTE — PROGRESS NOTE ADULT - SUBJECTIVE AND OBJECTIVE BOX
Patient seen at bedside resting comfortably offers no current complaints.  Ambulating and voiding without difficulty.  Passing flatus and tolerating regular diet. Pt both breast/bottle feeding.  Pt denies weakness, headache, chest pain, shortness of breath, N/V/D, dizziness, palpitations, worsening abdominal pain, worsening vaginal bleeding, or any other concerns.      Vital Signs Last 24 Hrs  T(C): 36.8 (11 Sep 2022 05:05), Max: 38.3 (10 Sep 2022 10:25)  T(F): 98.3 (11 Sep 2022 05:05), Max: 101 (10 Sep 2022 10:25)  HR: 85 (11 Sep 2022 05:05) (71 - 107)  BP: 114/73 (11 Sep 2022 05:05) (110/69 - 121/73)  BP(mean): --  RR: 18 (11 Sep 2022 05:05) (18 - 18)  SpO2: 99% (11 Sep 2022 05:05) (97% - 100%)    Parameters below as of 11 Sep 2022 05:05  Patient On (Oxygen Delivery Method): room air        Physical Exam:     Gen: A&Ox 3, NAD  Chest: CTA B/L  Cardiac: S1,S2; RRR  Breast: Soft, nontender, nonengorged  Abdomen: +BS; Soft, nontender, ND; Fundus firm below umbilicus  Gyn: Min lochia  Ext: Nontender, DTRS 2+, no worsening edema                          9.1    13.33 )-----------( 107      ( 10 Sep 2022 11:30 )             29.1        A/P:  22yo ppd#3 s/p  @ 40.1wks, acute blood loss anemia, noted to have postpartum fever tmax 101 on 9/10/22, suspected endometritis, pt stable  - gentamycin/clindamycin   -  follow up urine/blood cultures  -d/w Dr. Marcos  afebrile overnight   Patient seen at bedside resting comfortably offers no current complaints.  Ambulating and voiding without difficulty.  Passing flatus and tolerating regular diet. Pt bottle feeding. Reports mild swelling in her feet.  Pt denies weakness, headache, chest pain, shortness of breath, N/V/D, dizziness, palpitations, worsening abdominal pain, worsening vaginal bleeding, or any other concerns.      Vital Signs Last 24 Hrs  T(C): 36.8 (11 Sep 2022 05:05), Max: 38.3 (10 Sep 2022 10:25)  T(F): 98.3 (11 Sep 2022 05:05), Max: 101 (10 Sep 2022 10:25)  HR: 85 (11 Sep 2022 05:05) (71 - 107)  BP: 114/73 (11 Sep 2022 05:05) (110/69 - 121/73)  BP(mean): --  RR: 18 (11 Sep 2022 05:05) (18 - 18)  SpO2: 99% (11 Sep 2022 05:05) (97% - 100%)    Parameters below as of 11 Sep 2022 05:05  Patient On (Oxygen Delivery Method): room air        Physical Exam:     Gen: A&Ox 3, NAD  Chest: CTA B/L  Cardiac: S1,S2; RRR  Breast: Soft, nontender, nonengorged  Abdomen: +BS; Soft, nontender, ND; Fundus firm below umbilicus  Gyn: Min lochia  Ext: Nontender, DTRS 2+, minimal pedal edema, nonpitting                           9.1    13.33 )-----------( 107      ( 10 Sep 2022 11:30 )             29.1        A/P:  20yo ppd#3 s/p  @ 40.1wks, acute blood loss anemia, noted to have postpartum fever tmax 101 on 9/10/22, suspected endometritis, pt stable  - gentamycin/clindamycin   -  follow up urine/blood cultures  - cxr performed, radiology called to follow up official read  - pain management prn  - postpartum care  - encourage ambulation   - plan for discharge later td vs tomorrow   -d/w Dr. Molina

## 2022-09-12 VITALS
TEMPERATURE: 98 F | SYSTOLIC BLOOD PRESSURE: 118 MMHG | DIASTOLIC BLOOD PRESSURE: 79 MMHG | RESPIRATION RATE: 18 BRPM | HEART RATE: 88 BPM | OXYGEN SATURATION: 97 %

## 2022-09-12 LAB
CULTURE RESULTS: SIGNIFICANT CHANGE UP
SPECIMEN SOURCE: SIGNIFICANT CHANGE UP

## 2022-09-12 RX ORDER — FERROUS SULFATE 325(65) MG
1 TABLET ORAL
Qty: 60 | Refills: 0
Start: 2022-09-12 | End: 2022-10-11

## 2022-09-12 RX ORDER — ASCORBIC ACID 60 MG
1 TABLET,CHEWABLE ORAL
Qty: 30 | Refills: 0
Start: 2022-09-12 | End: 2022-10-11

## 2022-09-12 RX ORDER — IBUPROFEN 200 MG
1 TABLET ORAL
Qty: 120 | Refills: 0
Start: 2022-09-12 | End: 2022-10-11

## 2022-09-12 RX ORDER — DOCUSATE SODIUM 100 MG
1 CAPSULE ORAL
Qty: 60 | Refills: 0
Start: 2022-09-12 | End: 2022-10-11

## 2022-09-12 RX ADMIN — SODIUM CHLORIDE 3 MILLILITER(S): 9 INJECTION INTRAMUSCULAR; INTRAVENOUS; SUBCUTANEOUS at 06:07

## 2022-09-12 NOTE — PROGRESS NOTE ADULT - SUBJECTIVE AND OBJECTIVE BOX
Patient seen at bedside resting comfortably offers no current complaints.  Ambulating and voiding without difficulty.  Passing flatus, pos BM and tolerating regular diet.  both breast/bottle feeding.  Denies HA, CP, SOB, N/V/D, dizziness, palpitations, worsening abdominal pain, worsening vaginal bleeding, or any other concerns.      Vital Signs Last 24 Hrs  T(C): 36.7 (12 Sep 2022 03:23), Max: 36.9 (11 Sep 2022 09:24)  T(F): 98.1 (12 Sep 2022 03:23), Max: 98.5 (11 Sep 2022 19:15)  HR: 88 (12 Sep 2022 03:23) (69 - 88)  BP: 118/79 (12 Sep 2022 03:23) (107/71 - 122/83)  BP(mean): --  RR: 18 (12 Sep 2022 03:23) (18 - 18)  SpO2: 97% (12 Sep 2022 03:23) (97% - 99%)    Parameters below as of 12 Sep 2022 03:23  Patient On (Oxygen Delivery Method): room air        Physical Exam:     Gen: A&Ox 3, NAD  Chest: CTA B/L  Cardiac: S1,S2; RRR  Breast: Soft, nontender, nonengorged  Abdomen: +BS; Soft, nontender, ND; Fundus firm below umbilicus  Gyn: Min lochia  Ext: Nontender, DTRS 2+, no worsening edema                          10.1   10.87 )-----------( 147      ( 11 Sep 2022 13:40 )             32.4      urine/blood cultures-  < 10,000 nl microbes/ and no growth to date   A/P:  PPD#2 s/p     suspected [place]- afebrile > 24hrs  - Discharge home with instructions  -Follow up in office in 5-6 weeks for postpartum visit  -Breastfeeding encouraged   -d/w dr Molina

## 2022-09-15 LAB
CULTURE RESULTS: SIGNIFICANT CHANGE UP
CULTURE RESULTS: SIGNIFICANT CHANGE UP
SPECIMEN SOURCE: SIGNIFICANT CHANGE UP
SPECIMEN SOURCE: SIGNIFICANT CHANGE UP

## 2022-09-27 PROCEDURE — 71045 X-RAY EXAM CHEST 1 VIEW: CPT

## 2022-09-27 PROCEDURE — 59050 FETAL MONITOR W/REPORT: CPT

## 2022-09-27 PROCEDURE — 86901 BLOOD TYPING SEROLOGIC RH(D): CPT

## 2022-09-27 PROCEDURE — 36415 COLL VENOUS BLD VENIPUNCTURE: CPT

## 2022-09-27 PROCEDURE — 86780 TREPONEMA PALLIDUM: CPT

## 2022-09-27 PROCEDURE — 87086 URINE CULTURE/COLONY COUNT: CPT

## 2022-09-27 PROCEDURE — 87040 BLOOD CULTURE FOR BACTERIA: CPT

## 2022-09-27 PROCEDURE — 85384 FIBRINOGEN ACTIVITY: CPT

## 2022-09-27 PROCEDURE — 81001 URINALYSIS AUTO W/SCOPE: CPT

## 2022-09-27 PROCEDURE — 86769 SARS-COV-2 COVID-19 ANTIBODY: CPT

## 2022-09-27 PROCEDURE — 87635 SARS-COV-2 COVID-19 AMP PRB: CPT

## 2022-09-27 PROCEDURE — 85610 PROTHROMBIN TIME: CPT

## 2022-09-27 PROCEDURE — G0463: CPT

## 2022-09-27 PROCEDURE — 85025 COMPLETE CBC W/AUTO DIFF WBC: CPT

## 2022-09-27 PROCEDURE — 85730 THROMBOPLASTIN TIME PARTIAL: CPT

## 2022-09-27 PROCEDURE — 86850 RBC ANTIBODY SCREEN: CPT

## 2022-09-27 PROCEDURE — 86900 BLOOD TYPING SEROLOGIC ABO: CPT

## 2022-09-27 PROCEDURE — 59025 FETAL NON-STRESS TEST: CPT

## 2023-12-15 ENCOUNTER — INPATIENT (INPATIENT)
Facility: HOSPITAL | Age: 23
LOS: 2 days | Discharge: HOME | DRG: 872 | End: 2023-12-18
Attending: INTERNAL MEDICINE | Admitting: INTERNAL MEDICINE
Payer: COMMERCIAL

## 2023-12-15 VITALS
TEMPERATURE: 101 F | HEIGHT: 68 IN | WEIGHT: 138.01 LBS | DIASTOLIC BLOOD PRESSURE: 61 MMHG | HEART RATE: 124 BPM | RESPIRATION RATE: 22 BRPM | SYSTOLIC BLOOD PRESSURE: 99 MMHG | OXYGEN SATURATION: 100 %

## 2023-12-15 PROCEDURE — 71045 X-RAY EXAM CHEST 1 VIEW: CPT | Mod: 26

## 2023-12-15 PROCEDURE — 99285 EMERGENCY DEPT VISIT HI MDM: CPT

## 2023-12-15 RX ORDER — IPRATROPIUM/ALBUTEROL SULFATE 18-103MCG
3 AEROSOL WITH ADAPTER (GRAM) INHALATION ONCE
Refills: 0 | Status: COMPLETED | OUTPATIENT
Start: 2023-12-15 | End: 2023-12-15

## 2023-12-15 RX ORDER — ONDANSETRON 8 MG/1
4 TABLET, FILM COATED ORAL ONCE
Refills: 0 | Status: COMPLETED | OUTPATIENT
Start: 2023-12-15 | End: 2023-12-15

## 2023-12-15 RX ORDER — IBUPROFEN 200 MG
600 TABLET ORAL ONCE
Refills: 0 | Status: COMPLETED | OUTPATIENT
Start: 2023-12-15 | End: 2023-12-15

## 2023-12-15 RX ADMIN — Medication 3 MILLILITER(S): at 23:35

## 2023-12-15 RX ADMIN — Medication 600 MILLIGRAM(S): at 23:35

## 2023-12-15 RX ADMIN — ONDANSETRON 4 MILLIGRAM(S): 8 TABLET, FILM COATED ORAL at 23:35

## 2023-12-15 NOTE — ED ADULT TRIAGE NOTE - BSA (M2)
Pt reports he was going to jump into the river. Pt thinks his roommate told the police. [employer] [place] and [place]. Pt reports no previous attempts. Pt reports stress that no one cares, finals, isolation, and that he is not doing well enough.   
1.75

## 2023-12-15 NOTE — ED ADULT NURSE NOTE - OBJECTIVE STATEMENT
Patient c/o flu-like symptoms, body aches, fever and nausea x2 days. Pt stated highest fever at home 104F. Pt denies any chest pain, sob or v/d.

## 2023-12-15 NOTE — ED ADULT NURSE NOTE - NSFALLUNIVINTERV_ED_ALL_ED
Bed/Stretcher in lowest position, wheels locked, appropriate side rails in place/Call bell, personal items and telephone in reach/Instruct patient to call for assistance before getting out of bed/chair/stretcher/Non-slip footwear applied when patient is off stretcher/[place] to call system/Physically safe environment - no spills, clutter or unnecessary equipment/Purposeful proactive rounding/Room/bathroom lighting operational, light cord in reach Bed/Stretcher in lowest position, wheels locked, appropriate side rails in place/Call bell, personal items and telephone in reach/Instruct patient to call for assistance before getting out of bed/chair/stretcher/Non-slip footwear applied when patient is off stretcher/[place] to call system/Physically safe environment - no spills, clutter or unnecessary equipment/Purposeful proactive rounding/Room/bathroom lighting operational, light cord in reach

## 2023-12-16 DIAGNOSIS — A41.9 SEPSIS, UNSPECIFIED ORGANISM: ICD-10-CM

## 2023-12-16 DIAGNOSIS — E87.6 HYPOKALEMIA: ICD-10-CM

## 2023-12-16 DIAGNOSIS — Z29.9 ENCOUNTER FOR PROPHYLACTIC MEASURES, UNSPECIFIED: ICD-10-CM

## 2023-12-16 DIAGNOSIS — J11.1 INFLUENZA DUE TO UNIDENTIFIED INFLUENZA VIRUS WITH OTHER RESPIRATORY MANIFESTATIONS: ICD-10-CM

## 2023-12-16 DIAGNOSIS — J10.1 INFLUENZA DUE TO OTHER IDENTIFIED INFLUENZA VIRUS WITH OTHER RESPIRATORY MANIFESTATIONS: ICD-10-CM

## 2023-12-16 LAB
ALBUMIN SERPL ELPH-MCNC: 3.4 G/DL — LOW (ref 3.5–5)
ALBUMIN SERPL ELPH-MCNC: 3.4 G/DL — LOW (ref 3.5–5)
ALP SERPL-CCNC: 71 U/L — SIGNIFICANT CHANGE UP (ref 40–120)
ALP SERPL-CCNC: 71 U/L — SIGNIFICANT CHANGE UP (ref 40–120)
ALT FLD-CCNC: 13 U/L DA — SIGNIFICANT CHANGE UP (ref 10–60)
ALT FLD-CCNC: 13 U/L DA — SIGNIFICANT CHANGE UP (ref 10–60)
ANION GAP SERPL CALC-SCNC: 8 MMOL/L — SIGNIFICANT CHANGE UP (ref 5–17)
ANION GAP SERPL CALC-SCNC: 8 MMOL/L — SIGNIFICANT CHANGE UP (ref 5–17)
AST SERPL-CCNC: 10 U/L — SIGNIFICANT CHANGE UP (ref 10–40)
AST SERPL-CCNC: 10 U/L — SIGNIFICANT CHANGE UP (ref 10–40)
BASOPHILS # BLD AUTO: 0 K/UL — SIGNIFICANT CHANGE UP (ref 0–0.2)
BASOPHILS # BLD AUTO: 0 K/UL — SIGNIFICANT CHANGE UP (ref 0–0.2)
BASOPHILS NFR BLD AUTO: 0 % — SIGNIFICANT CHANGE UP (ref 0–2)
BASOPHILS NFR BLD AUTO: 0 % — SIGNIFICANT CHANGE UP (ref 0–2)
BILIRUB SERPL-MCNC: 0.4 MG/DL — SIGNIFICANT CHANGE UP (ref 0.2–1.2)
BILIRUB SERPL-MCNC: 0.4 MG/DL — SIGNIFICANT CHANGE UP (ref 0.2–1.2)
BUN SERPL-MCNC: 3 MG/DL — LOW (ref 7–18)
BUN SERPL-MCNC: 3 MG/DL — LOW (ref 7–18)
CALCIUM SERPL-MCNC: 8.9 MG/DL — SIGNIFICANT CHANGE UP (ref 8.4–10.5)
CALCIUM SERPL-MCNC: 8.9 MG/DL — SIGNIFICANT CHANGE UP (ref 8.4–10.5)
CHLORIDE SERPL-SCNC: 104 MMOL/L — SIGNIFICANT CHANGE UP (ref 96–108)
CHLORIDE SERPL-SCNC: 104 MMOL/L — SIGNIFICANT CHANGE UP (ref 96–108)
CO2 SERPL-SCNC: 23 MMOL/L — SIGNIFICANT CHANGE UP (ref 22–31)
CO2 SERPL-SCNC: 23 MMOL/L — SIGNIFICANT CHANGE UP (ref 22–31)
CREAT SERPL-MCNC: 0.71 MG/DL — SIGNIFICANT CHANGE UP (ref 0.5–1.3)
CREAT SERPL-MCNC: 0.71 MG/DL — SIGNIFICANT CHANGE UP (ref 0.5–1.3)
D DIMER BLD IA.RAPID-MCNC: 161 NG/ML DDU — SIGNIFICANT CHANGE UP
D DIMER BLD IA.RAPID-MCNC: 161 NG/ML DDU — SIGNIFICANT CHANGE UP
EGFR: 123 ML/MIN/1.73M2 — SIGNIFICANT CHANGE UP
EGFR: 123 ML/MIN/1.73M2 — SIGNIFICANT CHANGE UP
EOSINOPHIL # BLD AUTO: 0 K/UL — SIGNIFICANT CHANGE UP (ref 0–0.5)
EOSINOPHIL # BLD AUTO: 0 K/UL — SIGNIFICANT CHANGE UP (ref 0–0.5)
EOSINOPHIL NFR BLD AUTO: 0 % — SIGNIFICANT CHANGE UP (ref 0–6)
EOSINOPHIL NFR BLD AUTO: 0 % — SIGNIFICANT CHANGE UP (ref 0–6)
FLUAV AG NPH QL: DETECTED
FLUAV AG NPH QL: DETECTED
FLUBV AG NPH QL: SIGNIFICANT CHANGE UP
FLUBV AG NPH QL: SIGNIFICANT CHANGE UP
GLUCOSE SERPL-MCNC: 126 MG/DL — HIGH (ref 70–99)
GLUCOSE SERPL-MCNC: 126 MG/DL — HIGH (ref 70–99)
HCG SERPL-ACNC: <1 MIU/ML — SIGNIFICANT CHANGE UP
HCG SERPL-ACNC: <1 MIU/ML — SIGNIFICANT CHANGE UP
HCT VFR BLD CALC: 39.1 % — SIGNIFICANT CHANGE UP (ref 34.5–45)
HCT VFR BLD CALC: 39.1 % — SIGNIFICANT CHANGE UP (ref 34.5–45)
HGB BLD-MCNC: 12.8 G/DL — SIGNIFICANT CHANGE UP (ref 11.5–15.5)
HGB BLD-MCNC: 12.8 G/DL — SIGNIFICANT CHANGE UP (ref 11.5–15.5)
IMM GRANULOCYTES NFR BLD AUTO: 0.6 % — SIGNIFICANT CHANGE UP (ref 0–0.9)
IMM GRANULOCYTES NFR BLD AUTO: 0.6 % — SIGNIFICANT CHANGE UP (ref 0–0.9)
LYMPHOCYTES # BLD AUTO: 1.06 K/UL — SIGNIFICANT CHANGE UP (ref 1–3.3)
LYMPHOCYTES # BLD AUTO: 1.06 K/UL — SIGNIFICANT CHANGE UP (ref 1–3.3)
LYMPHOCYTES # BLD AUTO: 12.9 % — LOW (ref 13–44)
LYMPHOCYTES # BLD AUTO: 12.9 % — LOW (ref 13–44)
MCHC RBC-ENTMCNC: 28.8 PG — SIGNIFICANT CHANGE UP (ref 27–34)
MCHC RBC-ENTMCNC: 28.8 PG — SIGNIFICANT CHANGE UP (ref 27–34)
MCHC RBC-ENTMCNC: 32.7 GM/DL — SIGNIFICANT CHANGE UP (ref 32–36)
MCHC RBC-ENTMCNC: 32.7 GM/DL — SIGNIFICANT CHANGE UP (ref 32–36)
MCV RBC AUTO: 88.1 FL — SIGNIFICANT CHANGE UP (ref 80–100)
MCV RBC AUTO: 88.1 FL — SIGNIFICANT CHANGE UP (ref 80–100)
MONOCYTES # BLD AUTO: 0.85 K/UL — SIGNIFICANT CHANGE UP (ref 0–0.9)
MONOCYTES # BLD AUTO: 0.85 K/UL — SIGNIFICANT CHANGE UP (ref 0–0.9)
MONOCYTES NFR BLD AUTO: 10.4 % — SIGNIFICANT CHANGE UP (ref 2–14)
MONOCYTES NFR BLD AUTO: 10.4 % — SIGNIFICANT CHANGE UP (ref 2–14)
NEUTROPHILS # BLD AUTO: 6.25 K/UL — SIGNIFICANT CHANGE UP (ref 1.8–7.4)
NEUTROPHILS # BLD AUTO: 6.25 K/UL — SIGNIFICANT CHANGE UP (ref 1.8–7.4)
NEUTROPHILS NFR BLD AUTO: 76.1 % — SIGNIFICANT CHANGE UP (ref 43–77)
NEUTROPHILS NFR BLD AUTO: 76.1 % — SIGNIFICANT CHANGE UP (ref 43–77)
NRBC # BLD: 0 /100 WBCS — SIGNIFICANT CHANGE UP (ref 0–0)
NRBC # BLD: 0 /100 WBCS — SIGNIFICANT CHANGE UP (ref 0–0)
PLATELET # BLD AUTO: 111 K/UL — LOW (ref 150–400)
PLATELET # BLD AUTO: 111 K/UL — LOW (ref 150–400)
POTASSIUM SERPL-MCNC: 3 MMOL/L — LOW (ref 3.5–5.3)
POTASSIUM SERPL-MCNC: 3 MMOL/L — LOW (ref 3.5–5.3)
POTASSIUM SERPL-SCNC: 3 MMOL/L — LOW (ref 3.5–5.3)
POTASSIUM SERPL-SCNC: 3 MMOL/L — LOW (ref 3.5–5.3)
PROT SERPL-MCNC: 7.6 G/DL — SIGNIFICANT CHANGE UP (ref 6–8.3)
PROT SERPL-MCNC: 7.6 G/DL — SIGNIFICANT CHANGE UP (ref 6–8.3)
RBC # BLD: 4.44 M/UL — SIGNIFICANT CHANGE UP (ref 3.8–5.2)
RBC # BLD: 4.44 M/UL — SIGNIFICANT CHANGE UP (ref 3.8–5.2)
RBC # FLD: 13.3 % — SIGNIFICANT CHANGE UP (ref 10.3–14.5)
RBC # FLD: 13.3 % — SIGNIFICANT CHANGE UP (ref 10.3–14.5)
SARS-COV-2 RNA SPEC QL NAA+PROBE: SIGNIFICANT CHANGE UP
SARS-COV-2 RNA SPEC QL NAA+PROBE: SIGNIFICANT CHANGE UP
SODIUM SERPL-SCNC: 135 MMOL/L — SIGNIFICANT CHANGE UP (ref 135–145)
SODIUM SERPL-SCNC: 135 MMOL/L — SIGNIFICANT CHANGE UP (ref 135–145)
T3 SERPL-MCNC: 105 NG/DL — SIGNIFICANT CHANGE UP (ref 80–200)
T3 SERPL-MCNC: 105 NG/DL — SIGNIFICANT CHANGE UP (ref 80–200)
T4 AB SER-ACNC: 9.2 UG/DL — SIGNIFICANT CHANGE UP (ref 4.6–12)
T4 AB SER-ACNC: 9.2 UG/DL — SIGNIFICANT CHANGE UP (ref 4.6–12)
T4 FREE SERPL-MCNC: 1.1 NG/DL — SIGNIFICANT CHANGE UP (ref 0.9–1.8)
T4 FREE SERPL-MCNC: 1.1 NG/DL — SIGNIFICANT CHANGE UP (ref 0.9–1.8)
TSH SERPL-MCNC: 0.41 UU/ML — SIGNIFICANT CHANGE UP (ref 0.34–4.82)
TSH SERPL-MCNC: 0.41 UU/ML — SIGNIFICANT CHANGE UP (ref 0.34–4.82)
WBC # BLD: 8.21 K/UL — SIGNIFICANT CHANGE UP (ref 3.8–10.5)
WBC # BLD: 8.21 K/UL — SIGNIFICANT CHANGE UP (ref 3.8–10.5)
WBC # FLD AUTO: 8.21 K/UL — SIGNIFICANT CHANGE UP (ref 3.8–10.5)
WBC # FLD AUTO: 8.21 K/UL — SIGNIFICANT CHANGE UP (ref 3.8–10.5)

## 2023-12-16 PROCEDURE — 99222 1ST HOSP IP/OBS MODERATE 55: CPT | Mod: GC

## 2023-12-16 PROCEDURE — 93010 ELECTROCARDIOGRAM REPORT: CPT

## 2023-12-16 RX ORDER — KETOROLAC TROMETHAMINE 30 MG/ML
15 SYRINGE (ML) INJECTION ONCE
Refills: 0 | Status: DISCONTINUED | OUTPATIENT
Start: 2023-12-16 | End: 2023-12-16

## 2023-12-16 RX ORDER — PANTOPRAZOLE SODIUM 20 MG/1
40 TABLET, DELAYED RELEASE ORAL
Refills: 0 | Status: DISCONTINUED | OUTPATIENT
Start: 2023-12-17 | End: 2023-12-18

## 2023-12-16 RX ORDER — POTASSIUM CHLORIDE 20 MEQ
10 PACKET (EA) ORAL
Refills: 0 | Status: COMPLETED | OUTPATIENT
Start: 2023-12-16 | End: 2023-12-16

## 2023-12-16 RX ORDER — SODIUM CHLORIDE 9 MG/ML
2000 INJECTION INTRAMUSCULAR; INTRAVENOUS; SUBCUTANEOUS ONCE
Refills: 0 | Status: COMPLETED | OUTPATIENT
Start: 2023-12-16 | End: 2023-12-16

## 2023-12-16 RX ORDER — SODIUM CHLORIDE 9 MG/ML
1000 INJECTION INTRAMUSCULAR; INTRAVENOUS; SUBCUTANEOUS ONCE
Refills: 0 | Status: COMPLETED | OUTPATIENT
Start: 2023-12-16 | End: 2023-12-16

## 2023-12-16 RX ORDER — ONDANSETRON 8 MG/1
4 TABLET, FILM COATED ORAL ONCE
Refills: 0 | Status: COMPLETED | OUTPATIENT
Start: 2023-12-16 | End: 2023-12-16

## 2023-12-16 RX ORDER — SODIUM CHLORIDE 9 MG/ML
300 INJECTION INTRAMUSCULAR; INTRAVENOUS; SUBCUTANEOUS ONCE
Refills: 0 | Status: COMPLETED | OUTPATIENT
Start: 2023-12-16 | End: 2023-12-16

## 2023-12-16 RX ORDER — SODIUM CHLORIDE 9 MG/ML
1000 INJECTION, SOLUTION INTRAVENOUS
Refills: 0 | Status: DISCONTINUED | OUTPATIENT
Start: 2023-12-16 | End: 2023-12-16

## 2023-12-16 RX ORDER — DEXAMETHASONE 0.5 MG/5ML
6 ELIXIR ORAL ONCE
Refills: 0 | Status: COMPLETED | OUTPATIENT
Start: 2023-12-16 | End: 2023-12-16

## 2023-12-16 RX ORDER — DIPHENHYDRAMINE HYDROCHLORIDE AND LIDOCAINE HYDROCHLORIDE AND ALUMINUM HYDROXIDE AND MAGNESIUM HYDRO
5 KIT ONCE
Refills: 0 | Status: COMPLETED | OUTPATIENT
Start: 2023-12-16 | End: 2023-12-16

## 2023-12-16 RX ORDER — PANTOPRAZOLE SODIUM 20 MG/1
40 TABLET, DELAYED RELEASE ORAL ONCE
Refills: 0 | Status: COMPLETED | OUTPATIENT
Start: 2023-12-16 | End: 2023-12-16

## 2023-12-16 RX ORDER — SODIUM CHLORIDE 9 MG/ML
1000 INJECTION, SOLUTION INTRAVENOUS
Refills: 0 | Status: DISCONTINUED | OUTPATIENT
Start: 2023-12-16 | End: 2023-12-17

## 2023-12-16 RX ORDER — BENZOCAINE AND MENTHOL 5; 1 G/100ML; G/100ML
1 LIQUID ORAL EVERY 4 HOURS
Refills: 0 | Status: DISCONTINUED | OUTPATIENT
Start: 2023-12-16 | End: 2023-12-18

## 2023-12-16 RX ORDER — ACETAMINOPHEN 500 MG
650 TABLET ORAL EVERY 6 HOURS
Refills: 0 | Status: DISCONTINUED | OUTPATIENT
Start: 2023-12-16 | End: 2023-12-18

## 2023-12-16 RX ORDER — FAMOTIDINE 10 MG/ML
20 INJECTION INTRAVENOUS ONCE
Refills: 0 | Status: DISCONTINUED | OUTPATIENT
Start: 2023-12-16 | End: 2023-12-16

## 2023-12-16 RX ORDER — ONDANSETRON 8 MG/1
4 TABLET, FILM COATED ORAL EVERY 8 HOURS
Refills: 0 | Status: DISCONTINUED | OUTPATIENT
Start: 2023-12-16 | End: 2023-12-18

## 2023-12-16 RX ORDER — INFLUENZA VIRUS VACCINE 15; 15; 15; 15 UG/.5ML; UG/.5ML; UG/.5ML; UG/.5ML
0.5 SUSPENSION INTRAMUSCULAR ONCE
Refills: 0 | Status: COMPLETED | OUTPATIENT
Start: 2023-12-16 | End: 2023-12-17

## 2023-12-16 RX ORDER — POTASSIUM CHLORIDE 20 MEQ
40 PACKET (EA) ORAL ONCE
Refills: 0 | Status: COMPLETED | OUTPATIENT
Start: 2023-12-16 | End: 2023-12-16

## 2023-12-16 RX ORDER — FAMOTIDINE 10 MG/ML
20 INJECTION INTRAVENOUS ONCE
Refills: 0 | Status: COMPLETED | OUTPATIENT
Start: 2023-12-16 | End: 2023-12-16

## 2023-12-16 RX ADMIN — Medication 200 MILLIGRAM(S): at 17:39

## 2023-12-16 RX ADMIN — PANTOPRAZOLE SODIUM 40 MILLIGRAM(S): 20 TABLET, DELAYED RELEASE ORAL at 13:34

## 2023-12-16 RX ADMIN — Medication 600 MILLIGRAM(S): at 00:43

## 2023-12-16 RX ADMIN — SODIUM CHLORIDE 2000 MILLILITER(S): 9 INJECTION INTRAMUSCULAR; INTRAVENOUS; SUBCUTANEOUS at 03:37

## 2023-12-16 RX ADMIN — FAMOTIDINE 20 MILLIGRAM(S): 10 INJECTION INTRAVENOUS at 01:00

## 2023-12-16 RX ADMIN — SODIUM CHLORIDE 90 MILLILITER(S): 9 INJECTION, SOLUTION INTRAVENOUS at 23:34

## 2023-12-16 RX ADMIN — Medication 650 MILLIGRAM(S): at 23:32

## 2023-12-16 RX ADMIN — Medication 75 MILLIGRAM(S): at 17:39

## 2023-12-16 RX ADMIN — SODIUM CHLORIDE 1000 MILLILITER(S): 9 INJECTION INTRAMUSCULAR; INTRAVENOUS; SUBCUTANEOUS at 05:51

## 2023-12-16 RX ADMIN — Medication 200 MILLIGRAM(S): at 23:32

## 2023-12-16 RX ADMIN — Medication 40 MILLIEQUIVALENT(S): at 09:07

## 2023-12-16 RX ADMIN — Medication 6 MILLIGRAM(S): at 01:00

## 2023-12-16 RX ADMIN — DIPHENHYDRAMINE HYDROCHLORIDE AND LIDOCAINE HYDROCHLORIDE AND ALUMINUM HYDROXIDE AND MAGNESIUM HYDRO 5 MILLILITER(S): KIT at 04:15

## 2023-12-16 RX ADMIN — Medication 100 MILLIEQUIVALENT(S): at 08:56

## 2023-12-16 RX ADMIN — ONDANSETRON 4 MILLIGRAM(S): 8 TABLET, FILM COATED ORAL at 01:00

## 2023-12-16 RX ADMIN — Medication 15 MILLIGRAM(S): at 00:59

## 2023-12-16 RX ADMIN — Medication 15 MILLIGRAM(S): at 01:45

## 2023-12-16 RX ADMIN — Medication 100 MILLIEQUIVALENT(S): at 10:21

## 2023-12-16 RX ADMIN — Medication 100 MILLIEQUIVALENT(S): at 11:35

## 2023-12-16 RX ADMIN — SODIUM CHLORIDE 300 MILLILITER(S): 9 INJECTION INTRAMUSCULAR; INTRAVENOUS; SUBCUTANEOUS at 01:00

## 2023-12-16 RX ADMIN — SODIUM CHLORIDE 90 MILLILITER(S): 9 INJECTION, SOLUTION INTRAVENOUS at 13:33

## 2023-12-16 RX ADMIN — SODIUM CHLORIDE 1000 MILLILITER(S): 9 INJECTION INTRAMUSCULAR; INTRAVENOUS; SUBCUTANEOUS at 04:46

## 2023-12-16 NOTE — H&P ADULT - PROBLEM SELECTOR PLAN 4
- Does not meet IMPROVE Criteria for chemical DVT PPX  - Ambulate as tolerated, encourage ambulation

## 2023-12-16 NOTE — ED PROVIDER NOTE - PHYSICAL EXAMINATION
General: moderate distress dehydrated, viral-y  appears stated age  HEENT: normocephalic, atraumatic   CV tachycardiac   Respiratory: normal work of breathing  MSK: no swelling or tenderness of lower extremities, moving all extremities spontaneously   Skin: warm, dry  Neuro: A&Ox3, cranial nerves II-XII intact, 5/5 strength in all extremities, no sensory deficits, normal gait   Psych: appropriate affect

## 2023-12-16 NOTE — H&P ADULT - PROBLEM SELECTOR PLAN 3
- K on admission of 3.0  - Replaced with 40meq PO x1 and 10meq x3 IV + LR+K20meq 90cc/hr   - monitor BMP daily

## 2023-12-16 NOTE — H&P ADULT - HISTORY OF PRESENT ILLNESS
Patient is a 22 year old female from home, without significant PMH who presented to the ED for fever and coughing for 1 week.  Patient states for the past week she has felt sick with a fever, productive cough and has started having episodes of vomiting.  Patient states her vomiting started yesterday and has made her have decreased appetite.  Patient also complains of diarrhea for the past 3 days.  Patient states she has been taking Acetaminophen and Advil for her fever at home with some relief.  Patient states she has not had the Flu vaccine this year yet.  patient states she also has abdominal pain for the past 1-2 days.  Patient denies headache, blurry vision, dizziness, chest pain, abdominal pain, constipation, leg swelling. 
Adult

## 2023-12-16 NOTE — PATIENT PROFILE ADULT - FALL HARM RISK - UNIVERSAL INTERVENTIONS
Bed in lowest position, wheels locked, appropriate side rails in place/Call bell, personal items and telephone in reach/Instruct patient to call for assistance before getting out of bed or chair/Non-slip footwear when patient is out of bed/[place] to call system/Physically safe environment - no spills, clutter or unnecessary equipment/Purposeful Proactive Rounding/Room/bathroom lighting operational, light cord in reach Bed in lowest position, wheels locked, appropriate side rails in place/Call bell, personal items and telephone in reach/Instruct patient to call for assistance before getting out of bed or chair/Non-slip footwear when patient is out of bed/[place] to call system/Physically safe environment - no spills, clutter or unnecessary equipment/Purposeful Proactive Rounding/Room/bathroom lighting operational, light cord in reach

## 2023-12-16 NOTE — H&P ADULT - ATTENDING COMMENTS
22 year old female from home, without significant PMH who presented to the ED for fever and coughing for 1 week.  Patient states for the past week she has felt sick with a fever, productive cough and has started having episodes of vomiting.  Patient states her vomiting started yesterday and has made her have decreased appetite.  Patient also complains of diarrhea for the past 3 days.  Patient states she has been taking Acetaminophen and Advil for her fever at home with some relief.  Patient states she has not had the Flu vaccine this year yet.  patient states she also has abdominal pain for the past 1-2 days.  Patient denies headache, blurry vision, dizziness, chest pain, abdominal pain, constipation, leg swelling    Patient noted to have Influenza positive in ED    Vital Signs Last 24 Hrs  T(C): 36.9 (16 Dec 2023 11:22), Max: 38.3 (16 Dec 2023 00:39)  T(F): 98.4 (16 Dec 2023 11:22), Max: 100.9 (16 Dec 2023 00:39)  HR: 82 (16 Dec 2023 11:22) (82 - 126)  BP: 99/65 (16 Dec 2023 11:22) (91/54 - 100/62)  BP(mean): 74 (16 Dec 2023 04:20) (64 - 74)  RR: 17 (16 Dec 2023 11:22) (17 - 22)  SpO2: 99% (16 Dec 2023 11:22) (97% - 100%): room air    P/E:  Psych: AAO x3  Neuro: No gross focal deficits; Power and sensation intact  CVS: S1S2 present, regular, no edema  Resp: BLAE+, No wheeze or Rhonchi  GI: Soft, BS+, Non tender, non distended  Extr: No  calf tenderness B/L Lower extremities  Skin: Warm and moist without any rashes    Labs:                        12.8   8.21  )-----------( 111      ( 16 Dec 2023 01:01 )             39.1   12-16    135  |  104  |  3<L>  ----------------------------<  126<H>  3.0<L>   |  23  |  0.71    Ca    8.9      16 Dec 2023 01:01    TPro  7.6  /  Alb  3.4<L>  /  TBili  0.4  /  DBili  x   /  AST  10  /  ALT  13  /  AlkPhos  71  12-16    Flu With COVID-19 By KD (12.15.23 @ 23:48)     SARS-CoV-2 Result: NotDetec: EUA/IVD   Influenza A Result: Detected: EUA/IVD  Influenza B Result: NotDetec: EUA/IVD    D/D:  Sepsis with Viral Illness with  Influenza A positive  Hypokalemia sec. to GI losses and poor oral intake  Tachycardia sec, to dehydration  Epigastric pain likely Acute Gastritis  Diarrhea sec. to viral illness    Plan:  Tachycardia resolved; Admit to Tele   IV PPI x one dose for Gastritis then Protonix daily AM for a week  Although symptoms more than 72 hrs will give Tamiflu for any residual benefit  IV Fluid hydration with NS plus 20 meq KCL  Full liquids today; advance diet as tolerated over next 24 hrs  Replace potassium orally and with IV Fluids 22 year old female from home, without significant PMH who presented to the ED for fever and coughing for 1 week.  Patient states for the past week she has felt sick with a fever, productive cough and has started having episodes of vomiting.  Patient states her vomiting started yesterday and has made her have decreased appetite.  Patient also complains of diarrhea for the past 3 days.  Patient states she has been taking Acetaminophen and Advil for her fever at home with some relief.  Patient states she has not had the Flu vaccine this year yet.  patient states she also has abdominal pain for the past 1-2 days.  Patient denies headache, blurry vision, dizziness, chest pain, abdominal pain, constipation, leg swelling    Patient noted to have Influenza positive in ED; +cough, +abdominal pain especially epig; Diarrhea+ watery non bloody; +sore throat, +fever    Vital Signs Last 24 Hrs  T(C): 36.9 (16 Dec 2023 11:22), Max: 38.3 (16 Dec 2023 00:39)  T(F): 98.4 (16 Dec 2023 11:22), Max: 100.9 (16 Dec 2023 00:39)  HR: 82 (16 Dec 2023 11:22) (82 - 126)  BP: 99/65 (16 Dec 2023 11:22) (91/54 - 100/62)  BP(mean): 74 (16 Dec 2023 04:20) (64 - 74)  RR: 17 (16 Dec 2023 11:22) (17 - 22)  SpO2: 99% (16 Dec 2023 11:22) (97% - 100%): room air    P/E:  Psych: AAO x3  Neuro: No gross focal deficits; Power and sensation intact  CVS: S1S2 present, regular, no edema  Resp: BLAE+, No wheeze or Rhonchi  GI: Soft, BS+, Non tender, non distended  Extr: No  calf tenderness B/L Lower extremities  Skin: Warm and moist without any rashes    Labs:                        12.8   8.21  )-----------( 111      ( 16 Dec 2023 01:01 )             39.1   12-16    135  |  104  |  3<L>  ----------------------------<  126<H>  3.0<L>   |  23  |  0.71    Ca    8.9      16 Dec 2023 01:01    TPro  7.6  /  Alb  3.4<L>  /  TBili  0.4  /  DBili  x   /  AST  10  /  ALT  13  /  AlkPhos  71  12-16    Flu With COVID-19 By KD (12.15.23 @ 23:48)     SARS-CoV-2 Result: NotDetec: EUA/IVD   Influenza A Result: Detected: EUA/IVD  Influenza B Result: NotDetec: EUA/IVD    D/D:  Sepsis with Viral Illness with  Influenza A positive  Hypokalemia sec. to GI losses and poor oral intake  Tachycardia sec, to dehydration  Epigastric pain likely Acute Gastritis  Diarrhea sec. to viral illness    Plan:  Tachycardia resolved; Admit to Tele   IV PPI x one dose for Gastritis then Protonix daily AM for a week  Although symptoms more than 72 hrs will give Tamiflu for any residual benefit  IV Fluid hydration with NS plus 20 meq KCL  Full liquids today; advance diet as tolerated over next 24 hrs  Replace potassium orally and with IV Fluids  Ambulate as tolerated; patient is independently ambulating in ED holding 22 year old female from home, without significant PMH who presented to the ED for fever and coughing for 1 week.  Patient states for the past week she has felt sick with a fever, productive cough and has started having episodes of vomiting.  Patient states her vomiting started yesterday and has made her have decreased appetite.  Patient also complains of diarrhea for the past 3 days.  Patient states she has been taking Acetaminophen and Advil for her fever at home with some relief.  Patient states she has not had the Flu vaccine this year yet.  patient states she also has abdominal pain for the past 1-2 days.  Patient denies headache, blurry vision, dizziness, chest pain, abdominal pain, constipation, leg swelling    Patient noted to have Influenza positive in ED; +cough, +abdominal pain especially epig; Diarrhea+ watery non bloody; +sore throat, +fever    Vital Signs Last 24 Hrs  T(C): 36.9 (16 Dec 2023 11:22), Max: 38.3 (16 Dec 2023 00:39)  T(F): 98.4 (16 Dec 2023 11:22), Max: 100.9 (16 Dec 2023 00:39)  HR: 82 (16 Dec 2023 11:22) (82 - 126)  BP: 99/65 (16 Dec 2023 11:22) (91/54 - 100/62)  BP(mean): 74 (16 Dec 2023 04:20) (64 - 74)  RR: 17 (16 Dec 2023 11:22) (17 - 22)  SpO2: 99% (16 Dec 2023 11:22) (97% - 100%): room air    P/E:  Psych: AAO x3  Neuro: No gross focal deficits; Power and sensation intact  CVS: S1S2 present, regular, no edema  Resp: BLAE+, No wheeze or Rhonchi  GI: Soft, BS+, Non tender, non distended  Extr: No  calf tenderness B/L Lower extremities  Skin: Warm and moist without any rashes    Labs:                        12.8   8.21  )-----------( 111      ( 16 Dec 2023 01:01 )             39.1   12-16    135  |  104  |  3<L>  ----------------------------<  126<H>  3.0<L>   |  23  |  0.71    Ca    8.9      16 Dec 2023 01:01    TPro  7.6  /  Alb  3.4<L>  /  TBili  0.4  /  DBili  x   /  AST  10  /  ALT  13  /  AlkPhos  71  12-16    Flu With COVID-19 By KD (12.15.23 @ 23:48)     SARS-CoV-2 Result: NotDetec: EUA/IVD   Influenza A Result: Detected: EUA/IVD  Influenza B Result: NotDetec: EUA/IVD    D/D:  Sepsis with Viral Illness with  Influenza A positive  Hypokalemia sec. to GI losses and poor oral intake  Tachycardia sec, to dehydration  Epigastric pain likely Acute Gastritis  Diarrhea sec. to viral illness    Plan:  Tachycardia resolved; Admit to Tele   IV PPI x one dose for Gastritis then Protonix daily AM for a week  Although symptoms more than 72 hrs will give Tamiflu for any residual benefit  IV Fluid hydration with NS plus 20 meq KCL  Full liquids today; advance diet as tolerated over next 24 hrs  Replace potassium orally and with IV Fluids  Ambulate as tolerated; patient is independently ambulating in ED holding so will hold off DVT ppx  Although diarrhea likely from flu, will get a GI PCR, Ova and Parasites and Stool culture as patient from  and visited few months ago    Discussed with Patient findings and plan of care  Discussed with PGY2 ELOISA Jin and LORENA Khan RN 22 year old female from home, without significant PMH who presented to the ED for fever and coughing for 1 week.  Patient states for the past week she has felt sick with a fever, productive cough and has started having episodes of vomiting.  Patient states her vomiting started yesterday and has made her have decreased appetite.  Patient also complains of diarrhea for the past 3 days.  Patient states she has been taking Acetaminophen and Advil for her fever at home with some relief.  Patient states she has not had the Flu vaccine this year yet.  patient states she also has abdominal pain for the past 1-2 days.  Patient denies headache, blurry vision, dizziness, chest pain, abdominal pain, constipation, leg swelling    Patient noted to have Influenza positive in ED; +cough, +abdominal pain especially epig; Diarrhea+ watery non bloody; +sore throat, +fever    Vital Signs Last 24 Hrs  T(C): 36.9 (16 Dec 2023 11:22), Max: 38.3 (16 Dec 2023 00:39)  T(F): 98.4 (16 Dec 2023 11:22), Max: 100.9 (16 Dec 2023 00:39)  HR: 82 (16 Dec 2023 11:22) (82 - 126)  BP: 99/65 (16 Dec 2023 11:22) (91/54 - 100/62)  BP(mean): 74 (16 Dec 2023 04:20) (64 - 74)  RR: 17 (16 Dec 2023 11:22) (17 - 22)  SpO2: 99% (16 Dec 2023 11:22) (97% - 100%): room air    P/E:  Psych: AAO x3  Neuro: No gross focal deficits; Power and sensation intact  CVS: S1S2 present, regular, no edema  Resp: BLAE+, No wheeze or Rhonchi  GI: Soft, BS+, Non tender, non distended  Extr: No  calf tenderness B/L Lower extremities  Skin: Warm and moist without any rashes    Labs:                        12.8   8.21  )-----------( 111      ( 16 Dec 2023 01:01 )             39.1   12-16    135  |  104  |  3<L>  ----------------------------<  126<H>  3.0<L>   |  23  |  0.71    Ca    8.9      16 Dec 2023 01:01    TPro  7.6  /  Alb  3.4<L>  /  TBili  0.4  /  DBili  x   /  AST  10  /  ALT  13  /  AlkPhos  71  12-16    Flu With COVID-19 By KD (12.15.23 @ 23:48)     SARS-CoV-2 Result: NotDetec: EUA/IVD   Influenza A Result: Detected: EUA/IVD  Influenza B Result: NotDetec: EUA/IVD    D/D:  Sepsis with Viral Illness with  Influenza A positive  Hypokalemia sec. to GI losses and poor oral intake  Tachycardia sec, to dehydration  Epigastric pain likely Acute Gastritis  Diarrhea sec. to viral illness    Plan:  Tachycardia resolved; Admit to Medicine; HR controlled  IV PPI x one dose for Gastritis then Protonix daily AM for a week  Although symptoms more than 72 hrs will give Tamiflu for any residual benefit  IV Fluid hydration with NS plus 20 meq KCL  Full liquids today; advance diet as tolerated over next 24 hrs  Replace potassium orally and with IV Fluids  Ambulate as tolerated; patient is independently ambulating in ED holding so will hold off DVT ppx  Although diarrhea likely from flu, will get a GI PCR, Ova and Parasites and Stool culture as patient from  and visited few months ago    Discussed with Patient findings and plan of care  Discussed with PGY2 ELOISA Jin and ED Erin WELSH

## 2023-12-16 NOTE — H&P ADULT - NSHPPHYSICALEXAM_GEN_ALL_CORE
T(C): 36.7 (12-16-23 @ 07:50), Max: 38.3 (12-16-23 @ 00:39)  T(F): 98.1 (12-16-23 @ 07:50), Max: 100.9 (12-16-23 @ 00:39)  HR: 85 (12-16-23 @ 07:50) (85 - 126)  BP: 91/54 (12-16-23 @ 07:50) (91/54 - 100/62)  RR: 18 (12-16-23 @ 06:32) (18 - 22)  SpO2: 98% (12-16-23 @ 07:50) (97% - 100%)    GENERAL: NAD; lying in bed  HEAD:  Atraumatic, Normocephalic  EYES: EOMI, PERRLA, conjunctiva and sclera clear  ENMT: No tonsillar erythema, exudates, or enlargement;   NECK: Supple, normal appearance, No JVD; Normal thyroid;   NERVOUS SYSTEM:  Alert & Oriented X3,  Motor Strength 5/5 B/L upper and lower extremities, sensation intact  CHEST/LUNG: Lungs clear to auscultation bilaterally, No rales, rhonchi, wheezing   HEART: Regular rate and rhythm; No murmurs, rubs, or gallops  ABDOMEN: Soft, Diffusely tender, Nondistended; Bowel sounds present  EXTREMITIES:  2+ Peripheral Pulses, No clubbing, cyanosis, or edema  SKIN: No rashes or lesions;

## 2023-12-16 NOTE — H&P ADULT - NSHPREVIEWOFSYSTEMS_GEN_ALL_CORE
CONSTITUTIONAL: + fever, No weight loss, or fatigue  EYES: No eye pain, visual disturbances, or discharge  ENT:  No difficulty hearing, tinnitus, vertigo; No sinus or throat pain  NECK: No pain or stiffness  RESPIRATORY: + cough, No wheezing, chills or hemoptysis; No Shortness of Breath  CARDIOVASCULAR: No chest pain, palpitations, passing out, dizziness, or leg swelling  GASTROINTESTINAL: + abdominal pain. + nausea, vomiting, No hematemesis; + diarrhea No constipation. No melena or hematochezia.  GENITOURINARY: No dysuria, frequency, hematuria, or incontinence  NEUROLOGICAL: No headaches, memory loss, loss of strength, numbness, or tremors  SKIN: No itching, burning, rashes, or lesions

## 2023-12-16 NOTE — H&P ADULT - ASSESSMENT
Patient is a 22 year old female from home, without significant PMH who presented to the ED for fever and coughing for 1 week.  Patient was febrile in the ED with Tmax of 100.9F, tachycardic to 126, and BP ~96/60.  Patients Influenza A test resulted Positive.  Patient is being admitted to medicine for sepsis 2/2 Influenza A.

## 2023-12-16 NOTE — ED PROVIDER NOTE - CLINICAL SUMMARY MEDICAL DECISION MAKING FREE TEXT BOX
young health, viral syndrome, given 2 L IVFs and patient much better, ambulatory to bathroom, prolonged tachycardia so checked d-dimer, thryoid pregnancy, all normal so gave more fluids, vitals now normal   outpatient care young health, viral syndrome, given 2 L IVFs and patient much better, ambulatory to bathroom, prolonged tachycardia so checked d-dimer, thryoid pregnancy, all normal so gave more fluids, vitals now normal but on reassessment, patietn still lightheaded, orthostatics --> 82 lying to 99 standing despite 4 liters if NS   will admit   outpatient care

## 2023-12-16 NOTE — ED PROVIDER NOTE - PATIENT PORTAL LINK FT
2022     Marla Barrow (:  2018) is a 3 y.o. female, here for evaluation of the following medical concerns:    HPI  Abscess  Patient presents for evaluation of a cutaneous abscess. Lesion is located in the right, inferior gluteal cleft. Onset was 6 days ago. Symptoms have gradually improved. Abscess has associated symptoms of spontaneous drainage, pain. Patient does not have previous history of cutaneous abscesses. Patient does not have diabetes. Review of Systems   Constitutional:  Negative for activity change, crying and fever. Gastrointestinal:  Negative for diarrhea, nausea and vomiting. Skin:  Positive for color change and wound. Negative for rash. Hematological:  Negative for adenopathy. Prior to Visit Medications    Medication Sig Taking? Authorizing Provider   sulfamethoxazole-trimethoprim (BACTRIM;SEPTRA) 200-40 MG/5ML suspension Take 14.4 mLs by mouth 2 times daily for 10 days Yes Clemente Na, DO   acetaminophen (TYLENOL) 160 MG/5ML suspension Take 2.41 mLs by mouth every 6 hours as needed for Fever or Pain Yes HARMONY Mandel - ALEXANDER        Social History     Tobacco Use    Smoking status: Never    Smokeless tobacco: Never   Substance Use Topics    Alcohol use: No        Vitals:    22 1355   Temp: 97.9 °F (36.6 °C)   TempSrc: Temporal   Weight: 42 lb (19.1 kg)   Height: 41\" (104.1 cm)     Estimated body mass index is 17.57 kg/m² as calculated from the following:    Height as of this encounter: 41\" (104.1 cm). Weight as of this encounter: 42 lb (19.1 kg). Physical Exam  Constitutional:       General: She is active. Appearance: Normal appearance. She is well-developed and normal weight. HENT:      Head: Normocephalic and atraumatic. Cardiovascular:      Rate and Rhythm: Normal rate. Pulmonary:      Effort: Pulmonary effort is normal.   Skin:     General: Skin is warm and dry. Findings: Erythema present.       Comments: Abscess of the right inferior aspect of the gluteal cleft erythema extends about 3 cm, there is a small hole in the center draining purulence, able to express small amount of purulence followed by blood   Neurological:      General: No focal deficit present. Mental Status: She is alert and oriented for age. ASSESSMENT/PLAN:  1. Abscess of buttock, right: Spontaneous drainage right gluteal abscess. Expressed some purulence until only blood remained. Antibiotics as below. Counseled mom on signs and symptoms of recurrent or worsening infection and when to call or present to the office for evaluation. We will follow-up in 1 week to ensure resolution. - sulfamethoxazole-trimethoprim (BACTRIM;SEPTRA) 200-40 MG/5ML suspension; Take 14.4 mLs by mouth 2 times daily for 10 days  Dispense: 288 mL; Refill: 0    Return in about 1 week (around 8/30/2022). An electronic signature was used to authenticate this note.     --Jessica Gonzalez, DO on 8/23/2022 at 2:22 PM You can access the FollowMyHealth Patient Portal offered by [facility] by registering at the following website: [email or web address]. By joining [employer]’s FollowMyHealth portal, you will also be able to view your health information using other applications (apps) compatible with our system. You can access the FollowMyHealth Patient Portal offered by [facility] by registering at the following website: [email or web address]. By joining [email or web address]’s FollowMyHealth portal, you will also be able to view your health information using other applications (apps) compatible with our system.

## 2023-12-16 NOTE — H&P ADULT - PROBLEM SELECTOR PLAN 1
- Presented with fever, cough, abdominal pain, nausea, vomiting  - Found to be Influenza A Positive  - Has not received Flu vaccine this year  - Febrile to 100.9F  - Tachycardic to 126  - Saturating well on room air  - Start Standing fluids with [id number] @90cc/hr  - Start Tamiflu   - Supportive care  - Full liquid diet for now given nausea and vomiting, advance as tolerated  - Tylenol PRN for fever

## 2023-12-16 NOTE — ED PROVIDER NOTE - OBJECTIVE STATEMENT
23 yo female with fevers, chills, myalgias, cough, sore thorat, body aches x 2 days 21 yo female with fevers, chills, myalgias, cough, sore thorat, body aches x 2 days

## 2023-12-16 NOTE — ED ADULT NURSE REASSESSMENT NOTE - NS ED NURSE REASSESS COMMENT FT1
Pt admitted. a/w bed.
pt reports dizziness, provider made aware, orthostatic b/p ordered for pt.
pt reports feeling better, and 0/10 pain, pt ate food and was able to keep it down, pt reports not needing anything, no other concerns are noted.
Pt received at 0700. awake. Alert and oriented x3. Breathing regular. No shortness of breath. C/o dizziness.

## 2023-12-16 NOTE — ED PROVIDER NOTE - NSFOLLOWUPINSTRUCTIONS_ED_ALL_ED_FT
The treatment for all viruses is the same  1. PATIENCE - you will get better  2. FLUIDS - 2-4 LITERS of Pedialyte, Gatorade, Coconut water,  Joes Electrolye water or Add Electrolyte Packets (Airborne for example ) to 2-4 Liters of  water for 3-4 days!!     Without FLUIDS you will Feel WEAK, TIRED, FATIGUED, HAVE BODY ACHES and YOUR FEVER WILL NOT BREAK!! DONT SKIP THE FLUIDS  3. Ibuprofen/Motrin/Advil 600 mg every 6-8 hours  (weight based for kids)  4. If ibuprofen isn't breaking your fever, see #2...and you can also take tylenol 650-1000 every 6 hours  5. REST.  If you do other things with your energy, it'll take you more time to get better.       Influenza, Adult    Influenza is also called "the flu." It is an infection in the lungs, nose, and throat (respiratory tract). It is caused by a virus. The flu causes symptoms that are similar to symptoms of a cold. It also causes a high fever and body aches.    The flu spreads easily from person to person (is contagious). Getting a flu shot (influenza vaccination) every year is the best way to prevent the flu.    What are the causes?  This condition is caused by the influenza virus. You can get the virus by:  Breathing in droplets that are in the air from the cough or sneeze of a person who has the virus.Touching something that has the virus on it (is contaminated) and then touching your mouth, nose, or eyes.    What increases the risk?  Certain things may make you more likely to get the flu. These include:  Not washing your hands often.Having close contact with many people during cold and flu season. Touching your mouth, eyes, or nose without first washing your hands.Not getting a flu shot every year.    You may have a higher risk for the flu, along with serious problems such as a lung infection (pneumonia), if you:  Are older than 65. Are pregnant. Have a weakened disease-fighting system (immune system) because of a disease or taking certain medicines.Have a long-term (chronic) illness, such as:  Heart, kidney, or lung disease.Diabetes.Asthma.Have a liver disorder. Are very overweight (morbidly obese).Have anemia. This is a condition that affects your red blood cells.     What are the signs or symptoms?    Symptoms usually begin suddenly and last 4–14 days.   They may include:  Fever and chills. Headaches, body aches, or muscle aches. Sore throat. Cough. Runny or stuffy (congested) nose. Chest discomfort. Not wanting to eat as much as normal (poor appetite).Weakness or feeling tired (fatigue).Dizziness.Feeling sick to your stomach (nauseous) or throwing up (vomiting).    How is this treated?  If the flu is found early, you can be treated with medicine that can help reduce how bad the illness is and how long it lasts (antiviral medicine). This may be given by mouth (orally) or through an IV tube.  Taking care of yourself at home can help your symptoms get better. Your doctor may suggest:  Taking over-the-counter medicines. Drinking plenty of fluids.The flu often goes away on its own. If you have very bad symptoms or other problems, you may be treated in a hospital.      Follow these instructions at home:  Activity     Rest as needed. Get plenty of sleep.Stay home from work or school as told by your doctor.   Do not leave home until you do not have a fever for 24 hours without taking medicine. Leave home only to visit your doctor.    Eating and drinking     Take an ORS (oral rehydration solution). This is a drink that is sold at pharmacies and stores.Drink enough fluid to keep your pee (urine) pale yellow.Drink clear fluids in small amounts as you are able. Clear fluids include:  Water.Ice chips.Fruit juice that has water added (diluted fruit juice).Low-calorie sports drinks.Eat bland, easy-to-digest foods in small amounts as you are able. These foods include:  Bananas.Applesauce.Rice.Lean meats.Toast.Crackers.Do not eat or drink:  Fluids that have a lot of sugar or caffeine.Alcohol.Spicy or fatty foods.    General instructions     Take over-the-counter and prescription medicines only as told by your doctor.Use a cool mist humidifier to add moisture to the air in your home. This can make it easier for you to breathe.Cover your mouth and nose when you cough or sneeze.Wash your hands with soap and water often, especially after you cough or sneeze. If you cannot use soap and water, use alcohol-based hand .Keep all follow-up visits as told by your doctor. This is important.    How is this prevented?     Get a flu shot every year. You may get the flu shot in late summer, fall, or winter. Ask your doctor when you should get your flu shot.Avoid contact with people who are sick during fall and winter (cold and flu season).    Contact a doctor if:  You get new symptoms.You have:  Chest pain.Watery poop (diarrhea).A fever.Your cough gets worse.You start to have more mucus.You feel sick to your stomach.You throw up.    Get help right away if you:  Have shortness of breath.Have trouble breathing.Have skin or nails that turn a bluish color.Have very bad pain or stiffness in your neck.Get a sudden headache.Get sudden pain in your face or ear.Cannot eat or drink without throwing up.    Summary  Influenza ("the flu") is an infection in the lungs, nose, and throat. It is caused by a virus.Take over-the-counter and prescription medicines only as told by your doctor.Getting a flu shot every year is the best way to avoid getting the flu.

## 2023-12-17 LAB
ANION GAP SERPL CALC-SCNC: 3 MMOL/L — LOW (ref 5–17)
ANION GAP SERPL CALC-SCNC: 3 MMOL/L — LOW (ref 5–17)
BUN SERPL-MCNC: 3 MG/DL — LOW (ref 7–18)
BUN SERPL-MCNC: 3 MG/DL — LOW (ref 7–18)
CALCIUM SERPL-MCNC: 9.5 MG/DL — SIGNIFICANT CHANGE UP (ref 8.4–10.5)
CALCIUM SERPL-MCNC: 9.5 MG/DL — SIGNIFICANT CHANGE UP (ref 8.4–10.5)
CHLORIDE SERPL-SCNC: 109 MMOL/L — HIGH (ref 96–108)
CHLORIDE SERPL-SCNC: 109 MMOL/L — HIGH (ref 96–108)
CO2 SERPL-SCNC: 28 MMOL/L — SIGNIFICANT CHANGE UP (ref 22–31)
CO2 SERPL-SCNC: 28 MMOL/L — SIGNIFICANT CHANGE UP (ref 22–31)
CREAT SERPL-MCNC: 0.62 MG/DL — SIGNIFICANT CHANGE UP (ref 0.5–1.3)
CREAT SERPL-MCNC: 0.62 MG/DL — SIGNIFICANT CHANGE UP (ref 0.5–1.3)
EGFR: 129 ML/MIN/1.73M2 — SIGNIFICANT CHANGE UP
EGFR: 129 ML/MIN/1.73M2 — SIGNIFICANT CHANGE UP
GLUCOSE SERPL-MCNC: 94 MG/DL — SIGNIFICANT CHANGE UP (ref 70–99)
GLUCOSE SERPL-MCNC: 94 MG/DL — SIGNIFICANT CHANGE UP (ref 70–99)
HCT VFR BLD CALC: 41 % — SIGNIFICANT CHANGE UP (ref 34.5–45)
HCT VFR BLD CALC: 41 % — SIGNIFICANT CHANGE UP (ref 34.5–45)
HGB BLD-MCNC: 12.9 G/DL — SIGNIFICANT CHANGE UP (ref 11.5–15.5)
HGB BLD-MCNC: 12.9 G/DL — SIGNIFICANT CHANGE UP (ref 11.5–15.5)
MAGNESIUM SERPL-MCNC: 1.8 MG/DL — SIGNIFICANT CHANGE UP (ref 1.6–2.6)
MAGNESIUM SERPL-MCNC: 1.8 MG/DL — SIGNIFICANT CHANGE UP (ref 1.6–2.6)
MCHC RBC-ENTMCNC: 28 PG — SIGNIFICANT CHANGE UP (ref 27–34)
MCHC RBC-ENTMCNC: 28 PG — SIGNIFICANT CHANGE UP (ref 27–34)
MCHC RBC-ENTMCNC: 31.5 GM/DL — LOW (ref 32–36)
MCHC RBC-ENTMCNC: 31.5 GM/DL — LOW (ref 32–36)
MCV RBC AUTO: 88.9 FL — SIGNIFICANT CHANGE UP (ref 80–100)
MCV RBC AUTO: 88.9 FL — SIGNIFICANT CHANGE UP (ref 80–100)
NRBC # BLD: 0 /100 WBCS — SIGNIFICANT CHANGE UP (ref 0–0)
NRBC # BLD: 0 /100 WBCS — SIGNIFICANT CHANGE UP (ref 0–0)
PHOSPHATE SERPL-MCNC: 2.3 MG/DL — LOW (ref 2.5–4.5)
PHOSPHATE SERPL-MCNC: 2.3 MG/DL — LOW (ref 2.5–4.5)
PLATELET # BLD AUTO: 108 K/UL — LOW (ref 150–400)
PLATELET # BLD AUTO: 108 K/UL — LOW (ref 150–400)
POTASSIUM SERPL-MCNC: 4.3 MMOL/L — SIGNIFICANT CHANGE UP (ref 3.5–5.3)
POTASSIUM SERPL-MCNC: 4.3 MMOL/L — SIGNIFICANT CHANGE UP (ref 3.5–5.3)
POTASSIUM SERPL-SCNC: 4.3 MMOL/L — SIGNIFICANT CHANGE UP (ref 3.5–5.3)
POTASSIUM SERPL-SCNC: 4.3 MMOL/L — SIGNIFICANT CHANGE UP (ref 3.5–5.3)
RBC # BLD: 4.61 M/UL — SIGNIFICANT CHANGE UP (ref 3.8–5.2)
RBC # BLD: 4.61 M/UL — SIGNIFICANT CHANGE UP (ref 3.8–5.2)
RBC # FLD: 13.9 % — SIGNIFICANT CHANGE UP (ref 10.3–14.5)
RBC # FLD: 13.9 % — SIGNIFICANT CHANGE UP (ref 10.3–14.5)
SODIUM SERPL-SCNC: 140 MMOL/L — SIGNIFICANT CHANGE UP (ref 135–145)
SODIUM SERPL-SCNC: 140 MMOL/L — SIGNIFICANT CHANGE UP (ref 135–145)
WBC # BLD: 8.05 K/UL — SIGNIFICANT CHANGE UP (ref 3.8–10.5)
WBC # BLD: 8.05 K/UL — SIGNIFICANT CHANGE UP (ref 3.8–10.5)
WBC # FLD AUTO: 8.05 K/UL — SIGNIFICANT CHANGE UP (ref 3.8–10.5)
WBC # FLD AUTO: 8.05 K/UL — SIGNIFICANT CHANGE UP (ref 3.8–10.5)

## 2023-12-17 PROCEDURE — 99232 SBSQ HOSP IP/OBS MODERATE 35: CPT

## 2023-12-17 RX ORDER — SODIUM CHLORIDE 9 MG/ML
1000 INJECTION, SOLUTION INTRAVENOUS
Refills: 0 | Status: DISCONTINUED | OUTPATIENT
Start: 2023-12-17 | End: 2023-12-18

## 2023-12-17 RX ORDER — IBUPROFEN 200 MG
200 TABLET ORAL ONCE
Refills: 0 | Status: COMPLETED | OUTPATIENT
Start: 2023-12-17 | End: 2023-12-17

## 2023-12-17 RX ORDER — SODIUM,POTASSIUM PHOSPHATES 278-250MG
2 POWDER IN PACKET (EA) ORAL EVERY 6 HOURS
Refills: 0 | Status: DISCONTINUED | OUTPATIENT
Start: 2023-12-17 | End: 2023-12-17

## 2023-12-17 RX ORDER — SODIUM,POTASSIUM PHOSPHATES 278-250MG
1 POWDER IN PACKET (EA) ORAL
Refills: 0 | Status: COMPLETED | OUTPATIENT
Start: 2023-12-17 | End: 2023-12-18

## 2023-12-17 RX ADMIN — Medication 200 MILLIGRAM(S): at 05:17

## 2023-12-17 RX ADMIN — Medication 200 MILLIGRAM(S): at 18:38

## 2023-12-17 RX ADMIN — Medication 1 TABLET(S): at 18:38

## 2023-12-17 RX ADMIN — Medication 200 MILLIGRAM(S): at 23:11

## 2023-12-17 RX ADMIN — Medication 1 TABLET(S): at 23:11

## 2023-12-17 RX ADMIN — Medication 75 MILLIGRAM(S): at 18:38

## 2023-12-17 RX ADMIN — Medication 200 MILLIGRAM(S): at 23:50

## 2023-12-17 RX ADMIN — Medication 650 MILLIGRAM(S): at 00:45

## 2023-12-17 RX ADMIN — Medication 75 MILLIGRAM(S): at 05:17

## 2023-12-17 RX ADMIN — Medication 1 TABLET(S): at 11:23

## 2023-12-17 RX ADMIN — INFLUENZA VIRUS VACCINE 0.5 MILLILITER(S): 15; 15; 15; 15 SUSPENSION INTRAMUSCULAR at 00:54

## 2023-12-17 RX ADMIN — Medication 650 MILLIGRAM(S): at 10:06

## 2023-12-17 RX ADMIN — PANTOPRAZOLE SODIUM 40 MILLIGRAM(S): 20 TABLET, DELAYED RELEASE ORAL at 05:17

## 2023-12-17 RX ADMIN — Medication 200 MILLIGRAM(S): at 23:10

## 2023-12-17 RX ADMIN — Medication 650 MILLIGRAM(S): at 11:16

## 2023-12-17 RX ADMIN — Medication 200 MILLIGRAM(S): at 11:36

## 2023-12-17 NOTE — PROGRESS NOTE ADULT - ASSESSMENT
D/D:  Sepsis with Viral Illness with  Influenza A positive  Hypokalemia sec. to GI losses and poor oral intake  Tachycardia sec, to dehydration  Epigastric pain likely Acute Gastritis  Diarrhea sec. to viral illness    Plan:  Tachycardia resolved; HR controlled  Continue PPI oral daily   Although symptoms more than 72 hrs continue Tamiflu for any residual benefit  IV Fluid hydration continue x 24 hrs more  Full liquids today; advance diet as tolerated over next 24 hrs  Replace potassium orally and with IV Fluids  Ambulate as tolerated; patient is independently ambulating in ED holding so will hold off DVT ppx  Although diarrhea likely from flu, will get a GI PCR, Ova and Parasites and Stool culture as patient from  and visited few months ago    Discussed with Patient findings and plan of care  Discussed with PGY2 ELOISA Jin and ED Erin WELSH.    D/D:  Sepsis with Viral Illness with  Influenza A positive  Hypokalemia sec. to GI losses and poor oral intake replaced and normalized  Tachycardia sec, to dehydration resolved  Epigastric pain likely Acute Gastritis improved  Diarrhea sec. to viral illness    Plan:  Tachycardia resolved; HR controlled  Continue PPI oral daily   Continue Tamiflu for any residual benefit  Advance diet and monitor   Ambulate as tolerated;  If continues to have diarrhea please send GI PCR, O&P and GI PCR    Discussed with Patient findings and plan of care; D/C Plan AM  Discussed with RN and covering ACP

## 2023-12-17 NOTE — PROGRESS NOTE ADULT - SUBJECTIVE AND OBJECTIVE BOX
22 year old female from home, without significant PMH who presented to the ED for fever and coughing for 1 week.  Patient states for the past week she has felt sick with a fever, productive cough and has started having episodes of vomiting.  Patient states her vomiting started yesterday and has made her have decreased appetite.  Patient also complains of diarrhea for the past 3 days.  Patient states she has been taking Acetaminophen and Advil for her fever at home with some relief.  Patient states she has not had the Flu vaccine this year yet.  patient states she also has abdominal pain for the past 1-2 days.  Patient denies headache, blurry vision, dizziness, chest pain, abdominal pain, constipation, leg swelling    Patient noted to have Influenza positive in ED; +cough, +abdominal pain especially epig; Diarrhea+ watery non bloody; +sore throat, +fever    Vital Signs Last 24 Hrs  T(C): 36.9 (16 Dec 2023 11:22), Max: 38.3 (16 Dec 2023 00:39)  T(F): 98.4 (16 Dec 2023 11:22), Max: 100.9 (16 Dec 2023 00:39)  HR: 82 (16 Dec 2023 11:22) (82 - 126)  BP: 99/65 (16 Dec 2023 11:22) (91/54 - 100/62)  BP(mean): 74 (16 Dec 2023 04:20) (64 - 74)  RR: 17 (16 Dec 2023 11:22) (17 - 22)  SpO2: 99% (16 Dec 2023 11:22) (97% - 100%): room air    P/E:  Psych: AAO x3  Neuro: No gross focal deficits; Power and sensation intact  CVS: S1S2 present, regular, no edema  Resp: BLAE+, No wheeze or Rhonchi  GI: Soft, BS+, Non tender, non distended  Extr: No  calf tenderness B/L Lower extremities  Skin: Warm and moist without any rashes    Labs:                        12.9   8.05  )-----------( 108      ( 17 Dec 2023 06:41 )             41.0   12-17    140  |  109<H>  |  3<L>  ----------------------------<  94  4.3   |  28  |  0.62  Ca    9.5      17 Dec 2023 06:41  Phos  2.3     12-17  Mg     1.8     12-17  TPro  7.6  /  Alb  3.4<L>  /  TBili  0.4  /  DBili  x   /  AST  10  /  ALT  13  /  AlkPhos  71  12-16    Flu With COVID-19 By KD (12.15.23 @ 23:48)     SARS-CoV-2 Result: NotDetec: EUA/IVD   Influenza A Result: Detected: EUA/IVD  Influenza B Result: NotDetec: EUA/IVD         Patient seen and examined this morning around 10 AM    22 year old female from home, without significant PMH who presented to the ED for fever and coughing for 1 week.  Patient states for the past week she has felt sick with a fever, productive cough and has started having episodes of vomiting.  Patient states her vomiting started yesterday and has made her have decreased appetite.  Patient also complains of diarrhea for the past 3 days.  Patient states she has been taking Acetaminophen and Advil for her fever at home with some relief.  Patient states she has not had the Flu vaccine this year yet.  patient states she also has abdominal pain for the past 1-2 days.  Patient denies headache, blurry vision, dizziness, chest pain, abdominal pain, constipation, leg swelling    Patient noted to have Influenza positive in ED; +cough but improved today. abdominal pain improved but residual+;  Diarrhea resolved. ; +sore throat, fever resolved    Vital Signs Last 24 Hrs  T(C): 37 (17 Dec 2023 20:08), Max: 37 (17 Dec 2023 20:08)  T(F): 98.6 (17 Dec 2023 20:08), Max: 98.6 (17 Dec 2023 20:08)  HR: 83 (17 Dec 2023 20:08) (65 - 87)  BP: 101/61 (17 Dec 2023 20:08) (89/56 - 101/61)  RR: 18 (17 Dec 2023 20:08) (17 - 18)  SpO2: 96% (17 Dec 2023 20:08) (96% - 100%)): room air    P/E:  Psych: AAO x3  Neuro: No gross focal deficits; Power and sensation intact  CVS: S1S2 present, regular, no edema  Resp: BLAE+, No wheeze or Rhonchi  GI: Soft, BS+, mildly tender, non distended  Extr: No  calf tenderness B/L Lower extremities  Skin: Warm and moist without any rashes    Labs:                        12.9   8.05  )-----------( 108      ( 17 Dec 2023 06:41 )             41.0     12-17  140  |  109<H>  |  3<L>  ----------------------------<  94  4.3   |  28  |  0.62  Ca    9.5      17 Dec 2023 06:41  Phos  2.3     12-17  Mg     1.8     12-17    Flu With COVID-19 By KD (12.15.23 @ 23:48)     SARS-CoV-2 Result: NotDetec: EUA/IVD   Influenza A Result: Detected: EUA/IVD  Influenza B Result: NotDetec: EUA/IVD

## 2023-12-17 NOTE — DISCHARGE NOTE PROVIDER - NSDCCPCAREPLAN_GEN_ALL_CORE_FT
PRINCIPAL DISCHARGE DIAGNOSIS  Diagnosis: Sepsis  Assessment and Plan of Treatment: You presented to the hospital with complaints of fever and cough. In the Ed your heart rate and respiratory rate was elevated. You were diagnosed with sepsis. Sepsis is the body's extreme response to an infection. The cause of your sepsis is likel due to your influenza infection.   Call you Health care provider upon arrival home to make a one week follow up appointment.  If you develop fever, chills, malaise, or change in mental status call your Health Care Provider or go to the Emergency Department.  Nutrition is important, eat small frequent meals to help ensure you get adequate calories.  Do not stay in bed all day!  Increase your activity daily as tolerated.        SECONDARY DISCHARGE DIAGNOSES  Diagnosis: Influenza A  Assessment and Plan of Treatment: You tested positive for influenza type A. You were strated on Tamiflu antiviral. Influenza (the flu) is an infection caused by the influenza virus. The virus spreads through direct contact with someone who has the flu.   Continue with covering your cough and good hand washing practices.  Maintain adequate nutrition, hydration , rest, and activity as tolerated.   Follow-up with your primary care physician within 1 week. Call for appointment.       PRINCIPAL DISCHARGE DIAGNOSIS  Diagnosis: Sepsis  Assessment and Plan of Treatment: You presented to the hospital with complaints of fever and cough. In the ED your heart rate and respiratory rate was elevated. You were diagnosed with sepsis. Sepsis is the body's extreme response to an infection. You tested positive for Flu (Influenza A) in ED. The cause of your sepsis is likel due to your influenza infection.   Sepsis has resolved with fluids and supportive care  Call you Health care provider upon arrival home to make a one week follow up appointment.  If you develop fever, chills, malaise, or change in mental status call your Health Care Provider or go to the Emergency Department.  Nutrition is important, eat small frequent meals to help ensure you get adequate calories.  Do not stay in bed all day!  Increase your activity daily as tolerated.        SECONDARY DISCHARGE DIAGNOSES  Diagnosis: Influenza A  Assessment and Plan of Treatment: You tested positive for influenza type A. You were started on Tamiflu antiviral medication for Influenza (the flu) is an infection caused by the influenza virus. The virus spreads through direct contact with someone who has the flu. It is self resolving infection and needs supportive care with fluyids, medication s for fever. We treated you with Intravenous fluids and supportive care with with Tylenol and Cepacol for sore throat.   Please note that cough may take a week to resolve.   You may take Robitussin over the counter as needed for cough if significant   Continue with covering your cough and good hand washing practices.  Maintain adequate nutrition, hydration , rest, and activity as tolerated.   Follow-up with your primary care physician within 1 week. Call for appointment.  YOU MAY STAY HOME FOR TWO MORE DAYS AND RETURN TO WORK ON THURSDAY 12/21/23. YOU HAVE BEEN PROVIDED WITH A LETTER FOR YOUR WORK FOR EXCUSE FOR HOSPITAL STAY FOR 3 DAYS AND 2 DAYS REST.    Diagnosis: Hypokalemia  Assessment and Plan of Treatment: You was noted to have low potassium due to gastrointestinal losses from diarrhea. It corrected with replacement    Diagnosis: Diarrhea  Assessment and Plan of Treatment: You had significant diarrhea likely form the viral illlness which has resolved. Encourage oral fluids.

## 2023-12-17 NOTE — DISCHARGE NOTE PROVIDER - NSDCMRMEDTOKEN_GEN_ALL_CORE_FT
acetaminophen 325 mg oral tablet: 2 tab(s) orally every 6 hours As needed Temp greater or equal to 38C (100.4F), Mild Pain (1 - 3)  benzocaine-menthol 15 mg-10 mg mucous membrane lozenge: 1 lozenge orally 4 times a day as needed for sore throat  guaiFENesin 100 mg/5 mL oral liquid: 10 milliliter(s) orally every 6 hours  oseltamivir 75 mg oral capsule: 1 cap(s) orally 2 times a day   acetaminophen 325 mg oral tablet: 2 tab(s) orally every 6 hours As needed Temp greater or equal to 38C (100.4F), Mild Pain (1 - 3)  benzocaine-menthol 15 mg-10 mg mucous membrane lozenge: 1 lozenge orally 4 times a day as needed for sore throat  guaiFENesin 100 mg/5 mL oral liquid: 10 milliliter(s) orally every 6 hours  oseltamivir 75 mg oral capsule: 1 cap(s) orally 2 times a day  Tamiflu 75 mg oral capsule: 1 cap(s) orally 2 times a day

## 2023-12-17 NOTE — DISCHARGE NOTE PROVIDER - HOSPITAL COURSE
22 year old female from home, without significant PMH who presented to the ED for fever and coughing for 1 week, associated with decreased po intake, nausea and vomiting. Patient was febrile in the ED with Tmax of 100.9F, tachycardic to 126, and BP ~96/60.  Patients Influenza A test resulted Positive.  Patient is being admitted to medicine for sepsis 2/2 Influenza A. Pt started of IVF, Tamiflu and supportive care. Chest xray with no acute pathology. Pt condition improved. Case discussed with attending, pt medically stable for discharge home. Please note that this a brief summary of hospital course please refer to daily progress notes and consult notes for full course and events.     22 year old female from home, without significant PMH who presented to the ED for fever and coughing for 1 week, associated with decreased po intake, nausea and vomiting. Patient was febrile in the ED with Tmax of 100.9F, tachycardic to 126, and BP ~96/60.  Patients Influenza A test resulted Positive.  Patient is being admitted to medicine for sepsis 2/2 Influenza A. Pt started of IVF, Tamiflu and supportive care. Chest xray with no acute pathology. Pt condition improved. Pt medically stable for discharge home. Please note that this a brief summary of hospital course please refer to daily progress notes and consult notes for full course and events.

## 2023-12-17 NOTE — DISCHARGE NOTE PROVIDER - ATTENDING DISCHARGE PHYSICAL EXAMINATION:
Patient admitted with fever and cough noted to have Sepsis with Influenza treated with fluids and supportive care doing well  Sepsis resolved; no fever; cough still but much improved    P/E:  Psych: AAO x3  Neuro: No gross focal deficits; Power and sensation intact  CVS: S1S2 present, regular, no edema  Resp: BLAE+, No wheeze or Rhonchi  GI: Soft, BS+, Non tender, non distended  Extr: No  calf tenderness B/L Lower extremities  Skin: Warm and moist without any rashes    Plan:  D/C home today   Follow up PCP as outpt for continued medical care  Discussed with Patient   Discussed with MATEO Reid and RN  See discharge instructions

## 2023-12-18 VITALS
DIASTOLIC BLOOD PRESSURE: 63 MMHG | HEART RATE: 92 BPM | RESPIRATION RATE: 18 BRPM | SYSTOLIC BLOOD PRESSURE: 96 MMHG | TEMPERATURE: 98 F | OXYGEN SATURATION: 97 %

## 2023-12-18 PROCEDURE — 84100 ASSAY OF PHOSPHORUS: CPT

## 2023-12-18 PROCEDURE — 90686 IIV4 VACC NO PRSV 0.5 ML IM: CPT

## 2023-12-18 PROCEDURE — 96375 TX/PRO/DX INJ NEW DRUG ADDON: CPT

## 2023-12-18 PROCEDURE — 80053 COMPREHEN METABOLIC PANEL: CPT

## 2023-12-18 PROCEDURE — 84436 ASSAY OF TOTAL THYROXINE: CPT

## 2023-12-18 PROCEDURE — 85025 COMPLETE CBC W/AUTO DIFF WBC: CPT

## 2023-12-18 PROCEDURE — 84480 ASSAY TRIIODOTHYRONINE (T3): CPT

## 2023-12-18 PROCEDURE — 85379 FIBRIN DEGRADATION QUANT: CPT

## 2023-12-18 PROCEDURE — 84702 CHORIONIC GONADOTROPIN TEST: CPT

## 2023-12-18 PROCEDURE — 83735 ASSAY OF MAGNESIUM: CPT

## 2023-12-18 PROCEDURE — 94640 AIRWAY INHALATION TREATMENT: CPT

## 2023-12-18 PROCEDURE — 84443 ASSAY THYROID STIM HORMONE: CPT

## 2023-12-18 PROCEDURE — 99285 EMERGENCY DEPT VISIT HI MDM: CPT

## 2023-12-18 PROCEDURE — 99239 HOSP IP/OBS DSCHRG MGMT >30: CPT

## 2023-12-18 PROCEDURE — 71045 X-RAY EXAM CHEST 1 VIEW: CPT

## 2023-12-18 PROCEDURE — 36415 COLL VENOUS BLD VENIPUNCTURE: CPT

## 2023-12-18 PROCEDURE — 85027 COMPLETE CBC AUTOMATED: CPT

## 2023-12-18 PROCEDURE — 96374 THER/PROPH/DIAG INJ IV PUSH: CPT

## 2023-12-18 PROCEDURE — 80048 BASIC METABOLIC PNL TOTAL CA: CPT

## 2023-12-18 PROCEDURE — 87637 SARSCOV2&INF A&B&RSV AMP PRB: CPT

## 2023-12-18 PROCEDURE — 84439 ASSAY OF FREE THYROXINE: CPT

## 2023-12-18 RX ORDER — BENZOCAINE AND MENTHOL 5; 1 G/100ML; G/100ML
1 LIQUID ORAL
Qty: 2 | Refills: 0
Start: 2023-12-18 | End: 2023-12-22

## 2023-12-18 RX ORDER — ACETAMINOPHEN 500 MG
2 TABLET ORAL
Qty: 0 | Refills: 0 | DISCHARGE
Start: 2023-12-18

## 2023-12-18 RX ADMIN — PANTOPRAZOLE SODIUM 40 MILLIGRAM(S): 20 TABLET, DELAYED RELEASE ORAL at 06:04

## 2023-12-18 RX ADMIN — Medication 650 MILLIGRAM(S): at 09:08

## 2023-12-18 RX ADMIN — Medication 1 TABLET(S): at 06:04

## 2023-12-18 RX ADMIN — Medication 75 MILLIGRAM(S): at 06:04

## 2023-12-18 RX ADMIN — Medication 200 MILLIGRAM(S): at 11:13

## 2023-12-18 RX ADMIN — Medication 75 MILLIGRAM(S): at 17:01

## 2023-12-18 RX ADMIN — Medication 200 MILLIGRAM(S): at 06:04

## 2023-12-18 NOTE — DISCHARGE NOTE NURSING/CASE MANAGEMENT/SOCIAL WORK - NSDCPEFALRISK_GEN_ALL_CORE
For information on Fall & Injury Prevention, visit: [email or web address] OR  [email or web address] OR  https://www.cdc.gov/steadi/patient.html For information on Fall & Injury Prevention, visit: [email or web address] OR  [email or web address] OR  https://www.cdc.gov/steadi/patient.html

## 2023-12-18 NOTE — DISCHARGE NOTE NURSING/CASE MANAGEMENT/SOCIAL WORK - PATIENT PORTAL LINK FT
You can access the FollowMyHealth Patient Portal offered by [facility] by registering at the following website: [email or web address]. By joining "[employer]’s FollowMyHealth portal, you will also be able to view your health information using other applications (apps) compatible with our system. You can access the FollowMyHealth Patient Portal offered by [facility] by registering at the following website: [email or web address]. By joining [employer]’s FollowMyHealth portal, you will also be able to view your health information using other applications (apps) compatible with our system.

## 2023-12-18 NOTE — DISCHARGE NOTE NURSING/CASE MANAGEMENT/SOCIAL WORK - NSDCVIVACCINE_GEN_ALL_CORE_FT
influenza, injectable, quadrivalent, preservative free; 17-Dec-2023 00:54; Lolly Ang (RN); [employer]; [id number] (Exp. Date: 06-Dec-2024); IntraMuscular; Deltoid Left.; 0.5 milliLiter(s); VIS (VIS Published: 06-Aug-2021, VIS Presented: 17-Dec-2023);    influenza, injectable, quadrivalent, preservative free; 17-Dec-2023 00:54; Lolly Ang (RN); [employer]; [id number] (Exp. Date: 06-Dec-2024); IntraMuscular; Deltoid Left.; 0.5 milliLiter(s); VIS (VIS Published: 06-Aug-2021, VIS Presented: 17-Dec-2023);